# Patient Record
Sex: MALE | Employment: FULL TIME | URBAN - METROPOLITAN AREA
[De-identification: names, ages, dates, MRNs, and addresses within clinical notes are randomized per-mention and may not be internally consistent; named-entity substitution may affect disease eponyms.]

---

## 2018-01-17 ENCOUNTER — OFFICE VISIT (OUTPATIENT)
Dept: INTERNAL MEDICINE | Facility: CLINIC | Age: 53
End: 2018-01-17
Payer: COMMERCIAL

## 2018-01-17 VITALS
HEART RATE: 120 BPM | HEIGHT: 70 IN | DIASTOLIC BLOOD PRESSURE: 78 MMHG | TEMPERATURE: 99 F | OXYGEN SATURATION: 99 % | BODY MASS INDEX: 26.88 KG/M2 | RESPIRATION RATE: 18 BRPM | SYSTOLIC BLOOD PRESSURE: 120 MMHG | WEIGHT: 187.8 LBS

## 2018-01-17 DIAGNOSIS — E03.9 HYPOTHYROIDISM, UNSPECIFIED TYPE: Primary | ICD-10-CM

## 2018-01-17 DIAGNOSIS — E78.5 HYPERLIPIDEMIA, UNSPECIFIED HYPERLIPIDEMIA TYPE: ICD-10-CM

## 2018-01-17 DIAGNOSIS — Z00.00 ROUTINE HEALTH MAINTENANCE: ICD-10-CM

## 2018-01-17 DIAGNOSIS — R39.9 LOWER URINARY TRACT SYMPTOMS (LUTS): ICD-10-CM

## 2018-01-17 DIAGNOSIS — R73.02 IGT (IMPAIRED GLUCOSE TOLERANCE): ICD-10-CM

## 2018-01-17 RX ORDER — LEVOTHYROXINE SODIUM 150 UG/1
150 TABLET ORAL DAILY
Qty: 100 TABLET | Refills: 3 | Status: SHIPPED | OUTPATIENT
Start: 2018-01-17 | End: 2019-01-21

## 2018-01-17 ASSESSMENT — ENCOUNTER SYMPTOMS
DIFFICULTY URINATING: 0
SMELL DISTURBANCE: 0
ARTHRALGIAS: 1
MUSCLE WEAKNESS: 0
STIFFNESS: 0
SORE THROAT: 0
FLANK PAIN: 0
HEMATURIA: 0
SINUS PAIN: 0
DYSURIA: 0
TASTE DISTURBANCE: 0
NECK MASS: 0
BACK PAIN: 0
TROUBLE SWALLOWING: 0
MUSCLE CRAMPS: 0
HOARSE VOICE: 0
MYALGIAS: 0
NECK PAIN: 0
SINUS CONGESTION: 0
JOINT SWELLING: 0

## 2018-01-17 ASSESSMENT — PAIN SCALES - GENERAL: PAINLEVEL: NO PAIN (0)

## 2018-01-17 NOTE — MR AVS SNAPSHOT
After Visit Summary   1/17/2018    Alexa Shaikh    MRN: [id number]           Patient Information     Date Of Birth          1965        Visit Information        Provider Department      1/17/2018 5:05 PM Ritesh Paniagua MD [facility]        Today's Diagnoses     Hypothyroidism, unspecified type    -  1    Hyperlipidemia, unspecified hyperlipidemia type        IGT (impaired glucose tolerance)        Routine health maintenance        Lower urinary tract symptoms (LUTS)          Care Instructions    Primary Care Center Medication Refill Request Information:  * Please contact your pharmacy regarding ANY request for medication refills.  ** [facility] Prescription Fax = [phone]  * Please allow 3 business days for routine medication refills.  * Please allow 5 business days for controlled substance medication refills.     Primary Care Center Test Result notification information:  *You will be notified with in 7-10 days of your appointment day regarding the results of your test.  If you are on MyChart you will be notified as soon as the provider has reviewed the results and signed off on them.    Primary Care Center [phone]     Colonoscopy  [phone]            Follow-ups after your visit        Additional Services     GI Procedure Referral       Last Lab Result: Creatinine (mg/dL)       Date                     Value                 04/10/2015               0.88             ----------  Body mass index is 26.95 kg/(m^2).     Needed:  No  Language:  English    Patient will be contacted to schedule procedure.     Please be aware that coverage of these services is subject to the terms and limitations of your health insurance plan.  Call member services at your health plan with any benefit or coverage questions.  Any procedures must be performed at a [place] facility OR coordinated by your clinic's referral office.    Please bring the following with you to your  appointment:    (1) Any X-Rays, CTs or MRIs which have been performed.  Contact the facility where they were done to arrange for  prior to your scheduled appointment.    (2) List of current medications   (3) This referral request   (4) Any documents/labs given to you for this referral                  Follow-up notes from your care team     Return in about 1 year (around 1/17/2019).      Future tests that were ordered for you today     Open Future Orders        Priority Expected Expires Ordered    CBC with platelets differential Routine  1/17/2019 1/17/2018    Comprehensive metabolic panel Routine  1/17/2019 1/17/2018    Hemoglobin A1c Routine  1/17/2019 1/17/2018    Lipid Profile Routine  1/17/2019 1/17/2018    TSH Routine  1/17/2019 1/17/2018            Who to contact     Please call your clinic at [phone] to:    Ask questions about your health    Make or cancel appointments    Discuss your medicines    Learn about your test results    Speak to your doctor   If you have compliments or concerns about an experience at your clinic, or if you wish to file a complaint, please contact [facility] Patient Relations at [phone] or email us at [email or web address]         Additional Information About Your Visit        [employer] Information     [employer] gives you secure access to your electronic health record. If you see a primary care provider, you can also send messages to your care team and make appointments. If you have questions, please call your primary care clinic.  If you do not have a primary care provider, please call [phone] and they will assist you.      [employer] is an electronic gateway that provides easy, online access to your medical records. With [employer], you can request a clinic appointment, read your test results, renew a prescription or communicate with your care team.     To access your existing account, please contact your [facility]  [facility] Clinic or call [phone] for assistance.        Care EveryWhere ID     This is your Care EveryWhere ID. This could be used by other organizations to access your [place] medical records  [id number]        Your Vitals Were     Pulse Temperature Respirations Height Pulse Oximetry BMI (Body Mass Index)    120 99  F (37.2  C) (Oral) 18 1.778 m (5' 10\") 99% 26.95 kg/m2       Blood Pressure from Last 3 Encounters:   01/17/18 120/78   07/21/16 123/84   04/10/15 128/87    Weight from Last 3 Encounters:   01/17/18 85.2 kg (187 lb 12.8 oz)   07/21/16 89.4 kg (197 lb)   04/10/15 87 kg (191 lb 14.4 oz)              We Performed the Following     ADMIN: Vaccine, Initial (97286)     Albumin Random Urine Quantitative with Creat Ratio     GI Procedure Referral     Pneumococcal vaccine 23 valent PPSV23  (Pneumovax) [14784]          Today's Medication Changes          These changes are accurate as of: 1/17/18  5:57 PM.  If you have any questions, ask your nurse or doctor.               Stop taking these medicines if you haven't already. Please contact your care team if you have questions.     aspirin 81 MG EC tablet   Stopped by:  Ritesh Paniagua MD                Where to get your medicines      These medications were sent to [facility] [place], [place] - [address]  [address], [facility] [address]    Hours:  TRANSPLANT PHONE NUMBER [phone] Phone:  [phone]     levothyroxine 150 MCG tablet                Primary Care Provider Office Phone # Fax #    Ritesh Paniagua [phone]       [address] [place] [address] [address] [facility] [address]  [facility] [address]        Equal Access to Services     OFE SHER AH: Aj Buenrostro, jose eduardo jarrett, jb alonso. So [facility] [phone].    ATENCIÓN: Si habla español, tiene a waller disposición servicios gratuitos de asistencia lingüística. " Rosaura masters [phone].    We comply with applicable federal civil rights laws and [place] laws. We do not discriminate on the basis of race, color, national origin, age, disability, sex, sexual orientation, or gender identity.            Thank you!     Thank you for choosing [facility]  for your care. Our goal is always to provide you with excellent care. Hearing back from our patients is one way we can continue to improve our services. Please take a few minutes to complete the written survey that you may receive in the mail after your visit with us. Thank you!             Your Updated Medication List - Protect others around you: Learn how to safely use, store and throw away your medicines at www.disposemymeds.org.          This list is accurate as of: 1/17/18  5:57 PM.  Always use your most recent med list.                   Brand Name Dispense Instructions for use Diagnosis    DAILY MULTIVITAMIN PO      Take 1 tablet by mouth daily.        levothyroxine 150 MCG tablet    SYNTHROID/LEVOTHROID    100 tablet    Take 1 tablet (150 mcg) by mouth daily    Hypothyroidism, unspecified type

## 2018-01-17 NOTE — PROGRESS NOTES
HPI  52-year-old presents today for physical examination and reevaluation of his blood sugars.  He has dramatically improved his diet reduced sugar and refined carbohydrates he is exercising now on a regular basis his weight is down 10 pounds his blood sugars are much improved he is checking both fasting and 2 hours postprandial the highest 2 hours postprandial sugar is 145 fastings are running in the 100-120 range for the most part.  He is motivated to continue to exercise and watch his diet and keep the weight off.  He has been having some lower urinary tract symptoms his AUA score is 10 he is having nocturia ×1 his dad had significant BPH and was treated with Proscar we discussed the benefits and side effects of Proscar and he is going to defer on this at the present time.  Of note is that his prostate was not that large on examination today.  He has not had a recent lipid evaluation so he will return fasting for his lab studies.  He is taking the thyroid tolerating this well and having no ill effects from this.  Otherwise he has no complaints or problems.    Past and Family hx reviewed and updated    Past Medical History:   Diagnosis Date     Graves' disease     s/p I-131     hemorrhoids      Hyperlipidemia     early metabolic syndrome     Hypothyroidism      IGT (impaired glucose tolerance)      Lower urinary tract symptoms (LUTS) 2018     Past Surgical History:   Procedure Laterality Date     elective circumcision       Family History   Problem Relation Age of Onset     Thyroid Disease Sister      Graves d     CANCER Mother      throat cancer      Thyroid Disease Mother      Graves d     DIABETES Mother      Hypertension Father      Social History     Social History     Marital status:      Spouse name: N/A     Number of children: N/A     Years of education: N/A     Occupational History     research opthalmology [employer]     Social History Main Topics     Smoking status: Never Smoker      "Smokeless tobacco: Never Used     Alcohol use No     Drug use: No     Sexual activity: Not Asked     Other Topics Concern     Special Diet Yes     vegetarian     Exercise No     Social History Narrative     Answers for HPI/ROS submitted by the patient on 1/17/2018   General Symptoms: No  Skin Symptoms: No  HENT Symptoms: Yes  EYE SYMPTOMS: No  HEART SYMPTOMS: No  LUNG SYMPTOMS: No  INTESTINAL SYMPTOMS: No  URINARY SYMPTOMS: Yes  REPRODUCTIVE SYMPTOMS: No  SKELETAL SYMPTOMS: Yes  BLOOD SYMPTOMS: No  NERVOUS SYSTEM SYMPTOMS: No  MENTAL HEALTH SYMPTOMS: No  Ear pain: No  Ear discharge: No  Hearing loss: No  Tinnitus: No  Nosebleeds: Yes  Congestion: No  Sinus pain: No  Trouble swallowing: No   Voice hoarseness: No  Mouth sores: No  Sore throat: No  Tooth pain: No  Gum tenderness: No  Bleeding gums: No  Change in taste: No  Change in sense of smell: No  Dry mouth: No  Hearing aid used: No  Neck lump: No  Trouble holding urine or incontinence: No  Pain or burning: No  Trouble starting or stopping: No  Increased frequency of urination: Yes  Blood in urine: No  Decreased frequency of urination: No  Frequent nighttime urination: No  Flank pain: No  Difficulty emptying bladder: No  Back pain: No  Muscle aches: No  Neck pain: No  Swollen joints: No  Joint pain: Yes  Bone pain: No  Muscle cramps: No  Muscle weakness: No  Joint stiffness: No  Bone fracture: No    Exam:  /78 (BP Location: Right arm, Patient Position: Chair, Cuff Size: Adult Regular)  Pulse 120  Temp 99  F (37.2  C) (Oral)  Resp 18  Ht 1.778 m (5' 10\")  Wt 85.2 kg (187 lb 12.8 oz)  SpO2 99%  BMI 26.95 kg/m2  187 lbs 12.8 oz  Physical Exam   The patient is alert, oriented with a clear sensorium.   Skin shows no lesions or rashes and good turgor.   Head is normocephalic and atraumatic.   Eyes show PERRLA with benign optic fundi.   Ears show normal TMs bilaterally.   Mouth shows clear oral mucosa.   Neck shows no nodes, thyromegaly or bruits.   Back is " non tender.  Lungs are clear to percussion and auscultation.   Heart shows normal S1 and S2 without murmur or gallop.   Abdomen is soft and nontender without masses or organomegaly.   Genitalia show normal testes. No evidence of inguinal hernia.  Rectal shows moderate smooth prostate without nodules or masses.  Extremities show no edema and no evidence of active synovitis.   Neurologic examination shows cranial nerves II-XII intact. Motor shows 5/5 strength. Reflexes are symmetric. Cerebellar testing shows normal tandem gait.  Romberg negative.    ASSESSMENT  1 impaired glucose tolerance possible early diabetes appears well controlled  2 hyperlipidemia needs reevaluation  3 hypothyroidism on replacement  For lower urinary tract symptoms mild does not desire treatment    Plan  He needs a colonoscopy he needs his labs evaluated today we will update his immunizations with a pneumococcal 23 I renewed his thyroid at the current dose pending results of his TSH.  He will follow-up for fasting labs    This note was completed using Dragon voice recognition software.  Although reviewed after completion, some word and grammatical errors may occur.    Ritesh Paniagua MD  General Internal Medicine  Primary Care Center  [phone]

## 2018-01-17 NOTE — NURSING NOTE
Chief Complaint   Patient presents with     Physical     Patient is here for annual physical exam     Refill Request     Also here for Synthroid refills     John Dai CMA (AAMA) at 4:51 PM on 1/17/2018

## 2018-01-17 NOTE — PATIENT INSTRUCTIONS
[facility] Medication Refill Request Information:  * Please contact your pharmacy regarding ANY request for medication refills.  ** [facility] Prescription Fax = [phone]  * Please allow 3 business days for routine medication refills.  * Please allow 5 business days for controlled substance medication refills.     [facility] Test Result notification information:  *You will be notified with in 7-10 days of your appointment day regarding the results of your test.  If you are on MyChart you will be notified as soon as the provider has reviewed the results and signed off on them.    [facility] [phone]     [facility]  [phone]

## 2018-01-18 ENCOUNTER — TELEPHONE (OUTPATIENT)
Dept: GASTROENTEROLOGY | Facility: CLINIC | Age: 53
End: 2018-01-18

## 2018-01-18 NOTE — NURSING NOTE
Administered Pneumococcal 23 vaccine (see Immunizations in Chart Review). Patient tolerated well.  John Dai CMA at 6:03 PM on 1/17/2018

## 2018-01-19 ENCOUNTER — TELEPHONE (OUTPATIENT)
Dept: GASTROENTEROLOGY | Facility: CLINIC | Age: 53
End: 2018-01-19

## 2018-01-19 DIAGNOSIS — E03.9 HYPOTHYROIDISM, UNSPECIFIED TYPE: ICD-10-CM

## 2018-01-19 DIAGNOSIS — R73.02 IGT (IMPAIRED GLUCOSE TOLERANCE): ICD-10-CM

## 2018-01-19 DIAGNOSIS — E78.5 HYPERLIPIDEMIA, UNSPECIFIED HYPERLIPIDEMIA TYPE: ICD-10-CM

## 2018-01-19 LAB
ALBUMIN SERPL-MCNC: 4 G/DL (ref 3.4–5)
ALP SERPL-CCNC: 64 U/L (ref 40–150)
ALT SERPL W P-5'-P-CCNC: 31 U/L (ref 0–70)
ANION GAP SERPL CALCULATED.3IONS-SCNC: 5 MMOL/L (ref 3–14)
AST SERPL W P-5'-P-CCNC: 13 U/L (ref 0–45)
BASOPHILS # BLD AUTO: 0 10E9/L (ref 0–0.2)
BASOPHILS NFR BLD AUTO: 0.8 %
BILIRUB SERPL-MCNC: 1.2 MG/DL (ref 0.2–1.3)
BUN SERPL-MCNC: 13 MG/DL (ref 7–30)
CALCIUM SERPL-MCNC: 8.5 MG/DL (ref 8.5–10.1)
CHLORIDE SERPL-SCNC: 104 MMOL/L (ref 94–109)
CHOLEST SERPL-MCNC: 150 MG/DL
CO2 SERPL-SCNC: 29 MMOL/L (ref 20–32)
CREAT SERPL-MCNC: 0.93 MG/DL (ref 0.66–1.25)
CREAT UR-MCNC: 62 MG/DL
DIFFERENTIAL METHOD BLD: NORMAL
EOSINOPHIL # BLD AUTO: 0.1 10E9/L (ref 0–0.7)
EOSINOPHIL NFR BLD AUTO: 2.2 %
ERYTHROCYTE [DISTWIDTH] IN BLOOD BY AUTOMATED COUNT: 12.5 % (ref 10–15)
GFR SERPL CREATININE-BSD FRML MDRD: 85 ML/MIN/1.7M2
GLUCOSE SERPL-MCNC: 106 MG/DL (ref 70–99)
HBA1C MFR BLD: 5.9 % (ref 4.3–6)
HCT VFR BLD AUTO: 47.7 % (ref 40–53)
HDLC SERPL-MCNC: 46 MG/DL
HGB BLD-MCNC: 15.5 G/DL (ref 13.3–17.7)
IMM GRANULOCYTES # BLD: 0 10E9/L (ref 0–0.4)
IMM GRANULOCYTES NFR BLD: 0.2 %
LDLC SERPL CALC-MCNC: 84 MG/DL
LYMPHOCYTES # BLD AUTO: 1.3 10E9/L (ref 0.8–5.3)
LYMPHOCYTES NFR BLD AUTO: 27.3 %
MCH RBC QN AUTO: 30.7 PG (ref 26.5–33)
MCHC RBC AUTO-ENTMCNC: 32.5 G/DL (ref 31.5–36.5)
MCV RBC AUTO: 95 FL (ref 78–100)
MICROALBUMIN UR-MCNC: <5 MG/L
MICROALBUMIN/CREAT UR: NORMAL MG/G CR (ref 0–17)
MONOCYTES # BLD AUTO: 0.4 10E9/L (ref 0–1.3)
MONOCYTES NFR BLD AUTO: 7.6 %
NEUTROPHILS # BLD AUTO: 3 10E9/L (ref 1.6–8.3)
NEUTROPHILS NFR BLD AUTO: 61.9 %
NONHDLC SERPL-MCNC: 103 MG/DL
NRBC # BLD AUTO: 0 10*3/UL
NRBC BLD AUTO-RTO: 0 /100
PLATELET # BLD AUTO: 157 10E9/L (ref 150–450)
POTASSIUM SERPL-SCNC: 4.2 MMOL/L (ref 3.4–5.3)
PROT SERPL-MCNC: 7.3 G/DL (ref 6.8–8.8)
RBC # BLD AUTO: 5.05 10E12/L (ref 4.4–5.9)
SODIUM SERPL-SCNC: 138 MMOL/L (ref 133–144)
TRIGL SERPL-MCNC: 96 MG/DL
TSH SERPL DL<=0.005 MIU/L-ACNC: 2.37 MU/L (ref 0.4–4)
WBC # BLD AUTO: 4.9 10E9/L (ref 4–11)

## 2018-03-16 ENCOUNTER — TELEPHONE (OUTPATIENT)
Dept: GASTROENTEROLOGY | Facility: OUTPATIENT CENTER | Age: 53
End: 2018-03-16

## 2018-03-16 DIAGNOSIS — Z12.11 ENCOUNTER FOR SCREENING COLONOSCOPY: Primary | ICD-10-CM

## 2018-03-16 RX ORDER — PEG-3350, SODIUM SULFATE, SODIUM CHLORIDE, POTASSIUM CHLORIDE, SODIUM ASCORBATE AND ASCORBIC ACID 7.5-2.691G
1 KIT ORAL SEE ADMIN INSTRUCTIONS
Qty: 1 EACH | Refills: 0 | Status: SHIPPED | OUTPATIENT
Start: 2018-03-16 | End: 2018-03-20

## 2018-03-16 RX ORDER — SIMETHICONE 125 MG
125 CAPSULE ORAL SEE ADMIN INSTRUCTIONS
Qty: 1 CAPSULE | Refills: 0 | Status: SHIPPED | OUTPATIENT
Start: 2018-03-16 | End: 2018-03-20

## 2018-03-16 NOTE — TELEPHONE ENCOUNTER
Patient taking any blood thinners ? no    Heart disease ? denies    Lung disease ?denies      Sleep apnea ?denies    Diabetic ?denies    Kidney disease ?denies    Dialysis ? n/a    Electronic implanted medical devices ?denies    Are you taking any narcotic pain medication ?  no What is your daily dosage ?    PTSD ? n/a    Prep instructions reviewed with patient ? Patient declined review.  policy, conscious sedation plan reviewed. Advised patient to have someone stay with him post exam    Pharmacy : [facility]    Indication for procedure : Screening colonoscopy    Referring provider : Dr. Ritesh hu     Arrival Time : Patient will arrive at 8:30 AM

## 2018-03-23 ENCOUNTER — DOCUMENTATION ONLY (OUTPATIENT)
Dept: GASTROENTEROLOGY | Facility: OUTPATIENT CENTER | Age: 53
End: 2018-03-23
Payer: COMMERCIAL

## 2018-03-23 ENCOUNTER — TRANSFERRED RECORDS (OUTPATIENT)
Dept: HEALTH INFORMATION MANAGEMENT | Facility: CLINIC | Age: 53
End: 2018-03-23

## 2018-03-23 DIAGNOSIS — Z12.11 ENCOUNTER FOR SCREENING COLONOSCOPY: Primary | ICD-10-CM

## 2018-03-27 LAB — COPATH REPORT: NORMAL

## 2018-08-29 ENCOUNTER — OFFICE VISIT (OUTPATIENT)
Dept: INTERNAL MEDICINE | Facility: CLINIC | Age: 53
End: 2018-08-29
Payer: COMMERCIAL

## 2018-08-29 VITALS
DIASTOLIC BLOOD PRESSURE: 81 MMHG | RESPIRATION RATE: 16 BRPM | WEIGHT: 188 LBS | HEART RATE: 75 BPM | SYSTOLIC BLOOD PRESSURE: 133 MMHG | BODY MASS INDEX: 26.98 KG/M2

## 2018-08-29 DIAGNOSIS — R10.13 ABDOMINAL PAIN, EPIGASTRIC: Primary | ICD-10-CM

## 2018-08-29 RX ORDER — PANTOPRAZOLE SODIUM 40 MG/1
40 TABLET, DELAYED RELEASE ORAL DAILY
Qty: 30 TABLET | Refills: 3 | Status: SHIPPED | OUTPATIENT
Start: 2018-08-29 | End: 2019-01-21

## 2018-08-29 RX ORDER — CALCIUM CARBONATE 500 MG/1
1 TABLET, CHEWABLE ORAL 2 TIMES DAILY
COMMUNITY
End: 2019-01-21

## 2018-08-29 RX ORDER — MAGNESIUM CITRATE 1.75 G/29.6ML
LIQUID ORAL
Refills: 0 | COMMUNITY
Start: 2018-03-16 | End: 2018-09-26

## 2018-08-29 ASSESSMENT — PAIN SCALES - GENERAL: PAINLEVEL: NO PAIN (1)

## 2018-08-29 NOTE — PATIENT INSTRUCTIONS
[facility] Medication Refill Request Information:  * Please contact your pharmacy regarding ANY request for medication refills.  ** [facility] Prescription Fax = [phone]  * Please allow 3 business days for routine medication refills.  * Please allow 5 business days for controlled substance medication refills.     [facility] Test Result notification information:  *You will be notified with in 7-10 days of your appointment day regarding the results of your test.  If you are on MyChart you will be notified as soon as the provider has reviewed the results and signed off on them.    [facility]: [phone]

## 2018-08-29 NOTE — NURSING NOTE
Chief Complaint   Patient presents with     Abdominal Pain     pt states having stomache pain       Elyse Gifford CMA at 12:25 PM on 8/29/2018.

## 2018-08-29 NOTE — MR AVS SNAPSHOT
After Visit Summary   8/29/2018    Alexa Shaikh    MRN: [id number]           Patient Information     Date Of Birth          1965        Visit Information        Provider Department      8/29/2018 12:30 PM Penny Ma MD [facility]        Today's Diagnoses     Abdominal pain, epigastric    -  1      Care Instructions    Primary Care Center Medication Refill Request Information:  * Please contact your pharmacy regarding ANY request for medication refills.  ** [facility] Prescription Fax = [phone]  * Please allow 3 business days for routine medication refills.  * Please allow 5 business days for controlled substance medication refills.     [facility] Center Test Result notification information:  *You will be notified with in 7-10 days of your appointment day regarding the results of your test.  If you are on MyChart you will be notified as soon as the provider has reviewed the results and signed off on them.    [facility]: [phone]             Follow-ups after your visit        Follow-up notes from your care team     Return in about 4 weeks (around 9/26/2018) for Dr. Paniagua next available, Routine Visit.      Future tests that were ordered for you today     Open Future Orders        Priority Expected Expires Ordered    H Pylori antigen stool Routine  9/28/2018 8/29/2018            Who to contact     Please call your clinic at [phone] to:    Ask questions about your health    Make or cancel appointments    Discuss your medicines    Learn about your test results    Speak to your doctor            Additional Information About Your Visit        MyChart Information     [employer] gives you secure access to your electronic health record. If you see a primary care provider, you can also send messages to your care team and make appointments. If you have questions, please call your primary care clinic.  If you do not have a primary care provider, please call  [phone] and they will assist you.      [employer] is an electronic gateway that provides easy, online access to your medical records. With [employer], you can request a clinic appointment, read your test results, renew a prescription or communicate with your care team.     To access your existing account, please contact your [facility] or call [phone] for assistance.        Care EveryWhere ID     This is your Care EveryWhere ID. This could be used by other organizations to access your [place] medical records  [id number]        Your Vitals Were     Pulse Respirations BMI (Body Mass Index)             75 16 26.98 kg/m2          Blood Pressure from Last 3 Encounters:   08/29/18 133/81   01/17/18 120/78   07/21/16 123/84    Weight from Last 3 Encounters:   08/29/18 85.3 kg (188 lb)   01/17/18 85.2 kg (187 lb 12.8 oz)   07/21/16 89.4 kg (197 lb)                 Today's Medication Changes          These changes are accurate as of 8/29/18  1:25 PM.  If you have any questions, ask your nurse or doctor.               Start taking these medicines.        Dose/Directions    pantoprazole 40 MG EC tablet   Commonly known as:  PROTONIX   Used for:  Abdominal pain, epigastric   Started by:  Penny Ma MD        Dose:  40 mg   Take 1 tablet (40 mg) by mouth daily   Quantity:  30 tablet   Refills:  3            Where to get your medicines      [facility] [address] [place] [address] [address] [place] [address]  [address], [facility] [address]     Phone:  [phone]     pantoprazole 40 MG EC tablet                Primary Care Provider Office Phone # Fax #    Ritesh Paniagua [phone]       [address] [place] [address]        Equal Access to Services     OFE SHER AH: Aj Buenrostro, jose eduardo jarrett, qajb mahan  tiffanie oseilevarjoyce cota'aaclaus ah. So [facility] [phone].    ATENCIÓN: Si habla shantel, tiene a waller disposición servicios gratuitos de asistencia lingüística. Rosaura al [phone].    We comply with applicable federal civil rights laws and [place] laws. We do not discriminate on the basis of race, color, national origin, age, disability, sex, sexual orientation, or gender identity.            Thank you!     Thank you for choosing [facility]  for your care. Our goal is always to provide you with excellent care. Hearing back from our patients is one way we can continue to improve our services. Please take a few minutes to complete the written survey that you may receive in the mail after your visit with us. Thank you!             Your Updated Medication List - Protect others around you: Learn how to safely use, store and throw away your medicines at www.disposemymeds.org.          This list is accurate as of 8/29/18  1:25 PM.  Always use your most recent med list.                   Brand Name Dispense Instructions for use Diagnosis    calcium carbonate 500 MG chewable tablet    TUMS     Take 1 chew tab by mouth 2 times daily        DAILY MULTIVITAMIN PO      Take 1 tablet by mouth daily.        GNP MAGNESIUM CITRATE 1.745 GM/30ML solution   Generic drug:  magnesium citrate           levothyroxine 150 MCG tablet    SYNTHROID/LEVOTHROID    100 tablet    Take 1 tablet (150 mcg) by mouth daily    Hypothyroidism, unspecified type       pantoprazole 40 MG EC tablet    PROTONIX    30 tablet    Take 1 tablet (40 mg) by mouth daily    Abdominal pain, epigastric       ZANTAC PO      Take by mouth as needed for heartburn

## 2018-09-03 NOTE — PROGRESS NOTES
Alexa was seen today for upper to mid abdominal pain.  He is a 53 year old male with a past medical history of Graves' disease; hemorrhoids; Hyperlipidemia; Hypothyroidism; IGT (impaired glucose tolerance); and Lower urinary tract symptoms (LUTS) (1/17/2018).     Over the past four weeks, has had midepigastric to left upper and left middle abdominal pain, described as sharp, comes and goes, isn't always related to eating.  No stool changes such as diarrhea or melena.  No new foods or change in diet.  He tried OTC H2 blockers as well as tums which did seem to help somewhat.      He recently had his first screening colonoscopy which showed one, 4 mm polyp.  No diverticula were seen.    On exam  B/P: 133/81, P: 75, R: 16  Abd +BS, S, NT, ND, no masses or HSM  Skin no rashes or jaundice      A/P    Abdominal pain, epigastric; likely GERD, will check for H pylori and treat as indicated, start PPI.  If no improvement, then EGD would be next step.  -     H Pylori antigen stool; Future  -     pantoprazole (PROTONIX) 40 MG EC tablet; Take 1 tablet (40 mg) by mouth daily    Angel Wilson MD

## 2018-09-07 DIAGNOSIS — R10.13 ABDOMINAL PAIN, EPIGASTRIC: ICD-10-CM

## 2018-09-10 LAB
H PYLORI AG STL QL IA: NORMAL
SPECIMEN SOURCE: NORMAL

## 2018-09-26 ENCOUNTER — OFFICE VISIT (OUTPATIENT)
Dept: INTERNAL MEDICINE | Facility: CLINIC | Age: 53
End: 2018-09-26
Payer: COMMERCIAL

## 2018-09-26 VITALS
SYSTOLIC BLOOD PRESSURE: 134 MMHG | BODY MASS INDEX: 27.46 KG/M2 | DIASTOLIC BLOOD PRESSURE: 81 MMHG | WEIGHT: 191.4 LBS | TEMPERATURE: 98 F | RESPIRATION RATE: 18 BRPM | HEART RATE: 82 BPM

## 2018-09-26 DIAGNOSIS — R10.32 ABDOMINAL PAIN, LEFT LOWER QUADRANT: Primary | ICD-10-CM

## 2018-09-26 ASSESSMENT — PAIN SCALES - GENERAL: PAINLEVEL: NO PAIN (0)

## 2018-09-26 NOTE — PROGRESS NOTES
"[facility] Visit  Date: 9/26/18  Attending Provider: Ritesh Paniagua MD      Chief Complaint:  Follow-up from recent visit for abdominal pain    HPI:  Alexa Shaikh is a 53 year-old male who presents today for follow-up from recent visit for abdominal pain. Patient was seen in [facility] by Dr. Wilson on 8/29 following a 2-month hx of abdominal pain. Over those 2 months, he was experiencing episodes of LLQ pain most days (~4 days a week). This pain sometimes seemed to be associated with consumption of fatty foods and other times seemed to be more associated with stress rather than food. Pain is dull and doesn't radiate anywhere from the LLQ. No associated nausea or vomiting. Feels very gassy during these episodes (\"can feel the gas moving through the intestine in that area\") but no associated changes in bowel movements. Zantac and Tums helped a bit. He was seen in the [facility] for this on 8/29 and was started on a PPI at that time. Since starting this medication nearly a month ago now, patient has only had 2 episodes of the pain. Both episodes seemed to be directly associated with stressful events (patient had to fire 2 subordinates). Given the significant decrease in frequency of episodes, patient would like to continue with the PPI for now rather than pursuing any more extensive workup. Of note, patient did have a colonoscopy earlier this year, with 1 polyp removed but without other notable findings.    Medications and allergies were reviewed by me today.     PMH:  Hx of Graves disease s/p I-131  Hypothyroidism  HLD  IGT (impaired glucose tolerance)  LUTS (lower urinary tract symptoms)    SH:  Never smoker, no EtOH use, no other drug use.    ROS:  Negative except as above.    Physical Exam:  /81 (BP Location: Right arm, Patient Position: Sitting, Cuff Size: Adult Regular)  Pulse 82  Temp 98  F (36.7  C) (Oral)  Resp 18  Wt 86.8 kg (191 lb 6.4 oz)  BMI 27.46 kg/m2  Gen: patient appears in no acute " distress, cooperative and conversational   Eyes: anicteric  Resp: breathing appears nonlabored, lung sounds clear to auscultation bilaterally, no wheezes or crackles  CV: RRR, normal S1/S2, no murmurs, rubs, or gallops   Abd: soft, nondistended, nontender in all quadrants, normal bowel sounds  Skin: no lesions/rashes noticed, no jaundice     Assessment and Plan:  1. Abdominal pain    Patient here with episodes of LLQ abdominal pain, significantly decreased in frequency since starting PPI one month ago. Unclear etiology of patient's pain, but with such a dramatic response to the PPI, it would support an acid component to his discomfort. However, location of pain would be unusual for this. LLQ raises some suspicion for diverticular disease. Nothing noted on colonoscopy earlier this year. Other considerations would include gallbladder or pancreas etiology, given the possible association with fatty foods. Discussed options with patient. Plan is to continue with PPI for another month. If pain does not resolve (or worsens in frequency or severity), patient will call into the clinic and we will order either an ultrasound (if he reports association with food) or a CT (if pain doesn't seem to be closely related with food). At that time, we would consider ordering additional labwork as well.      Korin Lozada, MS4    Scribe Disclosure:   I, Korin Lozada, am a medical student serving as a scribe, to document services personally performed by Ritesh Paniagua MD, based on data collection and the provider's statements to me.  The medical student acted as scribe and the encounter documented above was completely performed by myself.  Supervising Doctor Ritesh Paniagua

## 2018-09-26 NOTE — NURSING NOTE
Chief Complaint   Patient presents with     Abdominal Pain     Patient is here for abdominal pain/issues follow up       John Dai CMA (AAMA) at 10:17 AM on 9/26/2018

## 2018-09-26 NOTE — MR AVS SNAPSHOT
After Visit Summary   9/26/2018    Alexa Shaikh    MRN: [id number]           Patient Information     Date Of Birth          1965        Visit Information        Provider Department      9/26/2018 10:20 AM Ritesh Paniagua MD [facility]        Today's Diagnoses     Abdominal pain, left lower quadrant    -  1       Follow-ups after your visit        Who to contact     Please call your clinic at [phone] to:    Ask questions about your health    Make or cancel appointments    Discuss your medicines    Learn about your test results    Speak to your doctor            Additional Information About Your Visit        MyChart Information     [employer] gives you secure access to your electronic health record. If you see a primary care provider, you can also send messages to your care team and make appointments. If you have questions, please call your primary care clinic.  If you do not have a primary care provider, please call [phone] and they will assist you.      [employer] is an electronic gateway that provides easy, online access to your medical records. With [employer], you can request a clinic appointment, read your test results, renew a prescription or communicate with your care team.     To access your existing account, please contact your [facility] or call [phone] for assistance.        Care EveryWhere ID     This is your Care EveryWhere ID. This could be used by other organizations to access your [place] medical records  [id number]        Your Vitals Were     Pulse Temperature Respirations BMI (Body Mass Index)          82 98  F (36.7  C) (Oral) 18 27.46 kg/m2         Blood Pressure from Last 3 Encounters:   09/26/18 134/81   08/29/18 133/81   01/17/18 120/78    Weight from Last 3 Encounters:   09/26/18 86.8 kg (191 lb 6.4 oz)   08/29/18 85.3 kg (188 lb)   01/17/18 85.2 kg (187 lb 12.8 oz)              Today, you had the following      No orders found for display         Today's Medication Changes          These changes are accurate as of 9/26/18 11:59 PM.  If you have any questions, ask your nurse or doctor.               Stop taking these medicines if you haven't already. Please contact your care team if you have questions.     GNP MAGNESIUM CITRATE 1.745 GM/30ML solution   Generic drug:  magnesium citrate   Stopped by:  Ritesh Paniagua MD           ZANTAC PO   Stopped by:  Ritesh Paniagua MD                    Primary Care Provider Office Phone # Fax #    Ritesh Paniagua [phone]       [address] [place] [address] [address]  [employer] [address]        Equal Access to Services     [facility]: Hadii jonel jimenez hadshilao Somelody, waaxda luqadaha, qaybta kaalmada adetonyayada, jb marshall . So [facility] [phone].    ATENCIÓN: Si habla español, tiene a waller disposición servicios gratuitos de asistencia lingüística. [facility] [phone].    We comply with applicable federal civil rights laws and [place] laws. We do not discriminate on the basis of race, color, national origin, age, disability, sex, sexual orientation, or gender identity.            Thank you!     Thank you for choosing [facility]  for your care. Our goal is always to provide you with excellent care. Hearing back from our patients is one way we can continue to improve our services. Please take a few minutes to complete the written survey that you may receive in the mail after your visit with us. Thank you!             Your Updated Medication List - Protect others around you: Learn how to safely use, store and throw away your medicines at www.disposemymeds.org.          This list is accurate as of 9/26/18 11:59 PM.  Always use your most recent med list.                   Brand Name Dispense Instructions for use Diagnosis    calcium carbonate 500 MG chewable tablet    TUMS     Take 1 chew tab by mouth 2 times  daily        DAILY MULTIVITAMIN PO      Take 1 tablet by mouth daily.        levothyroxine 150 MCG tablet    SYNTHROID/LEVOTHROID    100 tablet    Take 1 tablet (150 mcg) by mouth daily    Hypothyroidism, unspecified type       pantoprazole 40 MG EC tablet    PROTONIX    30 tablet    Take 1 tablet (40 mg) by mouth daily    Abdominal pain, epigastric

## 2018-10-05 ENCOUNTER — MYC MEDICAL ADVICE (OUTPATIENT)
Dept: INTERNAL MEDICINE | Facility: CLINIC | Age: 53
End: 2018-10-05

## 2018-10-05 DIAGNOSIS — R10.32 ABDOMINAL PAIN, LEFT LOWER QUADRANT: Primary | ICD-10-CM

## 2018-10-13 ENCOUNTER — RADIANT APPOINTMENT (OUTPATIENT)
Dept: ULTRASOUND IMAGING | Facility: CLINIC | Age: 53
End: 2018-10-13
Attending: INTERNAL MEDICINE
Payer: COMMERCIAL

## 2018-10-13 DIAGNOSIS — R10.32 ABDOMINAL PAIN, LEFT LOWER QUADRANT: ICD-10-CM

## 2018-10-22 DIAGNOSIS — R10.32 LLQ ABDOMINAL PAIN: ICD-10-CM

## 2018-10-22 DIAGNOSIS — R73.02 IGT (IMPAIRED GLUCOSE TOLERANCE): Primary | ICD-10-CM

## 2018-10-22 DIAGNOSIS — R93.5 ABNORMAL US (ULTRASOUND) OF ABDOMEN: ICD-10-CM

## 2019-01-02 ENCOUNTER — ANCILLARY PROCEDURE (OUTPATIENT)
Dept: CT IMAGING | Facility: CLINIC | Age: 54
End: 2019-01-02
Attending: INTERNAL MEDICINE
Payer: COMMERCIAL

## 2019-01-02 DIAGNOSIS — R10.32 LLQ ABDOMINAL PAIN: ICD-10-CM

## 2019-01-02 DIAGNOSIS — R93.5 ABNORMAL US (ULTRASOUND) OF ABDOMEN: ICD-10-CM

## 2019-01-02 RX ORDER — IOPAMIDOL 755 MG/ML
118 INJECTION, SOLUTION INTRAVASCULAR ONCE
Status: COMPLETED | OUTPATIENT
Start: 2019-01-02 | End: 2019-01-02

## 2019-01-02 RX ADMIN — IOPAMIDOL 118 ML: 755 INJECTION, SOLUTION INTRAVASCULAR at 13:00

## 2019-01-02 NOTE — DISCHARGE INSTRUCTIONS

## 2019-01-21 ENCOUNTER — OFFICE VISIT (OUTPATIENT)
Dept: INTERNAL MEDICINE | Facility: CLINIC | Age: 54
End: 2019-01-21
Payer: COMMERCIAL

## 2019-01-21 VITALS
BODY MASS INDEX: 26.86 KG/M2 | OXYGEN SATURATION: 99 % | SYSTOLIC BLOOD PRESSURE: 121 MMHG | HEIGHT: 70 IN | DIASTOLIC BLOOD PRESSURE: 84 MMHG | WEIGHT: 187.6 LBS | HEART RATE: 74 BPM

## 2019-01-21 DIAGNOSIS — R73.02 IGT (IMPAIRED GLUCOSE TOLERANCE): ICD-10-CM

## 2019-01-21 DIAGNOSIS — R93.5 ABNORMAL US (ULTRASOUND) OF ABDOMEN: ICD-10-CM

## 2019-01-21 DIAGNOSIS — E03.9 HYPOTHYROIDISM, UNSPECIFIED TYPE: Primary | ICD-10-CM

## 2019-01-21 DIAGNOSIS — E78.5 HYPERLIPIDEMIA, UNSPECIFIED HYPERLIPIDEMIA TYPE: ICD-10-CM

## 2019-01-21 DIAGNOSIS — D36.9 TUBULAR ADENOMA: ICD-10-CM

## 2019-01-21 DIAGNOSIS — E03.9 HYPOTHYROIDISM, UNSPECIFIED TYPE: ICD-10-CM

## 2019-01-21 LAB
ALBUMIN SERPL-MCNC: 4.1 G/DL (ref 3.4–5)
ALP SERPL-CCNC: 70 U/L (ref 40–150)
ALT SERPL W P-5'-P-CCNC: 33 U/L (ref 0–70)
ANION GAP SERPL CALCULATED.3IONS-SCNC: 5 MMOL/L (ref 3–14)
AST SERPL W P-5'-P-CCNC: 18 U/L (ref 0–45)
BASOPHILS # BLD AUTO: 0.1 10E9/L (ref 0–0.2)
BASOPHILS NFR BLD AUTO: 1 %
BILIRUB SERPL-MCNC: 1.1 MG/DL (ref 0.2–1.3)
BUN SERPL-MCNC: 15 MG/DL (ref 7–30)
CALCIUM SERPL-MCNC: 8.8 MG/DL (ref 8.5–10.1)
CHLORIDE SERPL-SCNC: 104 MMOL/L (ref 94–109)
CHOLEST SERPL-MCNC: 152 MG/DL
CO2 SERPL-SCNC: 28 MMOL/L (ref 20–32)
CREAT SERPL-MCNC: 0.86 MG/DL (ref 0.66–1.25)
DIFFERENTIAL METHOD BLD: NORMAL
EOSINOPHIL # BLD AUTO: 0.1 10E9/L (ref 0–0.7)
EOSINOPHIL NFR BLD AUTO: 1.8 %
ERYTHROCYTE [DISTWIDTH] IN BLOOD BY AUTOMATED COUNT: 12.1 % (ref 10–15)
GFR SERPL CREATININE-BSD FRML MDRD: >90 ML/MIN/{1.73_M2}
GLUCOSE SERPL-MCNC: 109 MG/DL (ref 70–99)
HBA1C MFR BLD: 6.4 % (ref 0–5.6)
HCT VFR BLD AUTO: 49 % (ref 40–53)
HDLC SERPL-MCNC: 45 MG/DL
HGB BLD-MCNC: 16.3 G/DL (ref 13.3–17.7)
IMM GRANULOCYTES # BLD: 0 10E9/L (ref 0–0.4)
IMM GRANULOCYTES NFR BLD: 0.4 %
LDLC SERPL CALC-MCNC: 88 MG/DL
LYMPHOCYTES # BLD AUTO: 1.1 10E9/L (ref 0.8–5.3)
LYMPHOCYTES NFR BLD AUTO: 22.4 %
MCH RBC QN AUTO: 31.1 PG (ref 26.5–33)
MCHC RBC AUTO-ENTMCNC: 33.3 G/DL (ref 31.5–36.5)
MCV RBC AUTO: 94 FL (ref 78–100)
MONOCYTES # BLD AUTO: 0.3 10E9/L (ref 0–1.3)
MONOCYTES NFR BLD AUTO: 6.9 %
NEUTROPHILS # BLD AUTO: 3.4 10E9/L (ref 1.6–8.3)
NEUTROPHILS NFR BLD AUTO: 67.5 %
NONHDLC SERPL-MCNC: 107 MG/DL
NRBC # BLD AUTO: 0 10*3/UL
NRBC BLD AUTO-RTO: 0 /100
PLATELET # BLD AUTO: 175 10E9/L (ref 150–450)
POTASSIUM SERPL-SCNC: 3.9 MMOL/L (ref 3.4–5.3)
PROT SERPL-MCNC: 7.6 G/DL (ref 6.8–8.8)
RBC # BLD AUTO: 5.24 10E12/L (ref 4.4–5.9)
SODIUM SERPL-SCNC: 138 MMOL/L (ref 133–144)
TRIGL SERPL-MCNC: 93 MG/DL
TSH SERPL DL<=0.005 MIU/L-ACNC: 0.19 MU/L (ref 0.4–4)
WBC # BLD AUTO: 5 10E9/L (ref 4–11)

## 2019-01-21 RX ORDER — LEVOTHYROXINE SODIUM 150 UG/1
150 TABLET ORAL DAILY
Qty: 100 TABLET | Refills: 3 | Status: SHIPPED | OUTPATIENT
Start: 2019-01-21 | End: 2019-01-21

## 2019-01-21 RX ORDER — LEVOTHYROXINE SODIUM 137 UG/1
137 TABLET ORAL DAILY
Qty: 100 TABLET | Refills: 3 | Status: SHIPPED | OUTPATIENT
Start: 2019-01-21 | End: 2019-02-07

## 2019-01-21 ASSESSMENT — ENCOUNTER SYMPTOMS
BLOATING: 0
CONSTIPATION: 0
JAUNDICE: 0
NAUSEA: 0
RECTAL PAIN: 0
HEARTBURN: 0
BLOOD IN STOOL: 0
ABDOMINAL PAIN: 0
VOMITING: 0
DIARRHEA: 0
BOWEL INCONTINENCE: 0

## 2019-01-21 ASSESSMENT — MIFFLIN-ST. JEOR: SCORE: 1700.33

## 2019-01-21 NOTE — PROGRESS NOTES
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  He has been doing much better in regards to the abdominal pain.  He will still feel occasional discomfort in the left lower quadrant with this has significantly improved and diminished from before.  Is no longer taking the PPI.  Is on thyroid supplementation is doing well with that.  Is exercising 4-5 days a week on an exercise bike for 20-30 minutes.  Having no symptoms or problems in association with this he is healthy with a low-carb diet.  He is current on his colonoscopy.  He is having a few more prostate symptoms.  He has had frequent weak stream and voiding.  His AUA score today is 14.  Past Medical History:   Diagnosis Date     Graves' disease     s/p I-131     hemorrhoids      Hyperlipidemia     early metabolic syndrome     Hypothyroidism      IGT (impaired glucose tolerance)      Lower urinary tract symptoms (LUTS) 2018     Past Surgical History:   Procedure Laterality Date     elective circumcision       Family History   Problem Relation Age of Onset     Thyroid Disease Sister         Graves d     Cancer Mother         throat cancer      Thyroid Disease Mother         Graves d     Diabetes Mother      Hypertension Father      Social History     Socioeconomic History     Marital status:      Spouse name: None     Number of children: None     Years of education: None     Highest education level: None   Social Needs     Financial resource strain: None     Food insecurity - worry: None     Food insecurity - inability: None     Transportation needs - medical: None     Transportation needs - non-medical: None   Occupational History     Occupation: research opthalmology     Employer: [employer]   Tobacco Use     Smoking status: Never Smoker     Smokeless tobacco: Never Used   Substance and Sexual Activity     Alcohol use: No     Alcohol/week: 0.0 oz     Drug use: No     Sexual activity: None   Other Topics Concern      "Parent/sibling w/ CABG, MI or angioplasty before 65F 55M? Not Asked      Service Not Asked     Blood Transfusions Not Asked     Caffeine Concern Not Asked     Occupational Exposure Not Asked     Hobby Hazards Not Asked     Sleep Concern Not Asked     Stress Concern Not Asked     Weight Concern Not Asked     Special Diet Yes     Comment: vegetarian     Back Care Not Asked     Exercise No     Bike Helmet Not Asked     Seat Belt Not Asked     Self-Exams Not Asked   Social History Narrative     None     Answers for HPI/ROS submitted by the patient on 1/21/2019   General Symptoms: No  Skin Symptoms: No  HENT Symptoms: No  EYE SYMPTOMS: No  HEART SYMPTOMS: No  LUNG SYMPTOMS: No  INTESTINAL SYMPTOMS: Yes  URINARY SYMPTOMS: No  REPRODUCTIVE SYMPTOMS: No  SKELETAL SYMPTOMS: No  BLOOD SYMPTOMS: No  NERVOUS SYSTEM SYMPTOMS: No  MENTAL HEALTH SYMPTOMS: No  Heart burn or indigestion: No  Nausea: No  Vomiting: No  Abdominal pain: No  Bloating: No  Constipation: No  Diarrhea: No  Blood in stool: No  Black stools: No  Rectal or Anal pain: No  Fecal incontinence: No  Yellowing of skin or eyes: No  Vomit with blood: No  Change in stools: No    Exam:  /84   Pulse 74   Ht 1.775 m (5' 9.88\")   Wt 85.1 kg (187 lb 9.6 oz)   SpO2 99%   BMI 27.01 kg/m    187 lbs 9.6 oz  Physical Exam   The patient is alert, oriented with a clear sensorium.   Skin shows no lesions or rashes and good turgor.   Head is normocephalic and atraumatic.   Eyes show PERRLA with poorly seen optic fundi.   Ears show normal TMs bilaterally.   Mouth shows clear oral mucosa.   Neck shows no nodes, thyromegaly or bruits.   Back is non tender.  Lungs are clear to percussion and auscultation.   Heart shows normal S1 and S2 without murmur or gallop.   Abdomen is soft and nontender without masses or organomegaly.   Genitalia show normal testes. No evidence of inguinal hernia.  Rectal shows moderate smooth prostate without nodules or masses.  Extremities " show no edema and no evidence of active synovitis.   Neurologic examination shows cranial nerves II-XII intact. Motor shows 5/5 strength. Reflexes are symmetric. Cerebellar testing shows normal tandem gait.  Romberg negative.      ASSESSMENT  1 hypothyroidism on replacement needs reassessment and dose reduction  2 impaired glucose tolerance needs reassessment and more aggressive treatment with exercise  3 lower urinary tract symptoms  4 history tubular adenoma    Plan  We discussed the risk and benefits of both tamsulosin and finasteride.  At this point he is going to defer medication management but may call for one or both of these in the future and I told him he did not need an appointment to get a new prescription.  We will recheck his labs today encouraged him regarding his diet and exercise and have him follow-up in a year    This note was completed using Dragon voice recognition software.  Although reviewed after completion, some word and grammatical errors may occur.    Ritesh Paniagua MD  General Internal Medicine  Primary Care Center  [phone]

## 2019-01-21 NOTE — PATIENT INSTRUCTIONS
Primary Care Center Phone Number: [phone]   Primary Care Center Medication Refill Request Information:  * Please contact your pharmacy regarding ANY request for medication refills.  ** [facility] Prescription Fax = [phone]  * Please allow 3 business days for routine medication refills.  * Please allow 5 business days for controlled substance medication refills.     Primary [facility] Test Result notification information:  *You will be notified with in 7-10 days of your appointment day regarding the results of your test.  If you are on MyChart you will be notified as soon as the provider has reviewed the results and signed off on them.

## 2019-01-21 NOTE — NURSING NOTE
Chief Complaint   Patient presents with     Physical     pt is here for an annual physical       Sherie Lam EMT at 8:56 AM on 1/21/2019

## 2019-02-05 ENCOUNTER — MYC MEDICAL ADVICE (OUTPATIENT)
Dept: INTERNAL MEDICINE | Facility: CLINIC | Age: 54
End: 2019-02-05

## 2019-02-05 DIAGNOSIS — E03.9 HYPOTHYROIDISM, UNSPECIFIED TYPE: ICD-10-CM

## 2019-02-07 RX ORDER — LEVOTHYROXINE SODIUM 137 UG/1
137 TABLET ORAL DAILY
Qty: 100 TABLET | Refills: 3 | Status: SHIPPED | OUTPATIENT
Start: 2019-02-07 | End: 2020-01-23

## 2019-02-07 NOTE — TELEPHONE ENCOUNTER
levothyroxine (SYNTHROID/LEVOTHROID) 137 MCG tablet  Pharmacy change per tiffanyhart per OC Paniagua/ HI Roach

## 2019-11-04 ENCOUNTER — HEALTH MAINTENANCE LETTER (OUTPATIENT)
Age: 54
End: 2019-11-04

## 2020-01-23 ENCOUNTER — OFFICE VISIT (OUTPATIENT)
Dept: INTERNAL MEDICINE | Facility: CLINIC | Age: 55
End: 2020-01-23
Payer: COMMERCIAL

## 2020-01-23 VITALS
HEIGHT: 69 IN | OXYGEN SATURATION: 98 % | BODY MASS INDEX: 27.64 KG/M2 | TEMPERATURE: 98.4 F | DIASTOLIC BLOOD PRESSURE: 83 MMHG | WEIGHT: 186.6 LBS | SYSTOLIC BLOOD PRESSURE: 126 MMHG | HEART RATE: 81 BPM

## 2020-01-23 DIAGNOSIS — Z23 NEED FOR VACCINATION: Primary | ICD-10-CM

## 2020-01-23 DIAGNOSIS — R39.9 LOWER URINARY TRACT SYMPTOMS (LUTS): ICD-10-CM

## 2020-01-23 DIAGNOSIS — R73.02 IGT (IMPAIRED GLUCOSE TOLERANCE): ICD-10-CM

## 2020-01-23 DIAGNOSIS — E78.5 HYPERLIPIDEMIA, UNSPECIFIED HYPERLIPIDEMIA TYPE: ICD-10-CM

## 2020-01-23 DIAGNOSIS — E03.9 HYPOTHYROIDISM, UNSPECIFIED TYPE: ICD-10-CM

## 2020-01-23 RX ORDER — LEVOTHYROXINE SODIUM 137 UG/1
137 TABLET ORAL DAILY
Qty: 100 TABLET | Refills: 3 | Status: SHIPPED | OUTPATIENT
Start: 2020-01-23 | End: 2020-01-27

## 2020-01-23 RX ORDER — TAMSULOSIN HYDROCHLORIDE 0.4 MG/1
0.4 CAPSULE ORAL DAILY
Qty: 100 CAPSULE | Refills: 3 | Status: SHIPPED | OUTPATIENT
Start: 2020-01-23 | End: 2021-01-26

## 2020-01-23 ASSESSMENT — PAIN SCALES - GENERAL: PAINLEVEL: NO PAIN (0)

## 2020-01-23 ASSESSMENT — ENCOUNTER SYMPTOMS
DIFFICULTY URINATING: 1
FLANK PAIN: 0
DYSURIA: 0
DOUBLE VISION: 0
EYE PAIN: 0
EYE WATERING: 1
HEMATURIA: 0
EYE REDNESS: 1

## 2020-01-23 ASSESSMENT — MIFFLIN-ST. JEOR: SCORE: 1683.28

## 2020-01-23 NOTE — PATIENT INSTRUCTIONS
Primary Care Center Medication Refill Request Information:  * Please contact your pharmacy regarding ANY request for medication refills.  ** [facility] Prescription Fax = [phone]  * Please allow 3 business days for routine medication refills.  * Please allow 5 business days for controlled substance medication refills.     Primary Care Center Test Result notification information:  *You will be notified with in 7-10 days of your appointment day regarding the results of your test.  If you are on MyChart you will be notified as soon as the provider has reviewed the results and signed off on them.    Primary Care Center: [phone]       Your health care Provider has recommended that you receive the new Shingle vaccine called Shingrix to prevent shingles for ages 50 and above. Many private insurance and Medicare Part D plans cover Shingrix. However, not all insurance carriers cover the entire cost of the Shingrix vaccine if the vaccine is administered at your primary care clinic. The clinic cannot determine your insurance benefits.  Please call your insurance carrier prior. The vaccine comes in two doses. Your second dose should be 2-6 months from your first dose.       Prior to receiving the vaccine, we recommend that you call your insurance carrier and ask them the following questions:            1. Is there a cost difference if I receive the vaccine at my doctor's office or a pharmacy?          2. Does my insurance cover the Shingrix Vaccine and administration of the vaccine?          3. What is my co-pay or deductible for the vaccine?        Please call to schedule a Nurse-Visit only at [phone].  Nurse Visit hours are available Monday, Wednesday, and Friday from 9:00 AM-11:00 AM and 1:00 PM-3:00 PM.

## 2020-01-23 NOTE — NURSING NOTE
Chief Complaint   Patient presents with     Physical     pt here for physical     Medication Request     pt would like medication refills       Elyse Gifford CMA at 4:08 PM on 1/23/2020.

## 2020-01-23 NOTE — PROGRESS NOTES
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  He has been doing well he is tolerating his thyroid replacement.  He is continued to have ongoing lower urinary tract symptoms.  This is largely characterized by nocturia and urgency.  His AUA score today is 12.  Is very between 10 and 14 over the past couple years.  He is frustrated enough now however that is interested in starting medication.  We discussed this last year and he deferred.  He is now interested in the tamsulosin and we again reviewed the potential side effects.  Is also concerned about his impaired glucose tolerance he has been more careful with his diet his exercise is restricted to about 20 minutes on an exercise bike 3 times a week we discussed the importance of increasing this.  Try to watch the calories and fat in his diet.  Otherwise he has been feeling well and has no other specific complaints or concerns  Past Medical History:   Diagnosis Date     Graves' disease     s/p I-131     hemorrhoids      Hyperlipidemia     early metabolic syndrome     Hypothyroidism      IGT (impaired glucose tolerance)      Lower urinary tract symptoms (LUTS) 2018     Tubular adenoma 2018     Past Surgical History:   Procedure Laterality Date     elective circumcision       Family History   Problem Relation Age of Onset     Thyroid Disease Sister         Graves d     Cancer Mother         throat cancer      Thyroid Disease Mother         Graves d     Diabetes Mother      Hypertension Father      Social History     Socioeconomic History     Marital status:      Spouse name: None     Number of children: None     Years of education: None     Highest education level: None   Occupational History     Occupation: research opthalmology     Employer: [employer]   Social Needs     Financial resource strain: None     Food insecurity:     Worry: None     Inability: None     Transportation needs:     Medical: None      Non-medical: None   Tobacco Use     Smoking status: Never Smoker     Smokeless tobacco: Never Used   Substance and Sexual Activity     Alcohol use: No     Alcohol/week: 0.0 standard drinks     Drug use: No     Sexual activity: None   Lifestyle     Physical activity:     Days per week: None     Minutes per session: None     Stress: None   Relationships     Social connections:     Talks on phone: None     Gets together: None     Attends Oriental orthodox service: None     Active member of club or organization: None     Attends meetings of clubs or organizations: None     Relationship status: None     Intimate partner violence:     Fear of current or ex partner: None     Emotionally abused: None     Physically abused: None     Forced sexual activity: None   Other Topics Concern     Parent/sibling w/ CABG, MI or angioplasty before 65F 55M? Not Asked      Service Not Asked     Blood Transfusions Not Asked     Caffeine Concern Not Asked     Occupational Exposure Not Asked     Hobby Hazards Not Asked     Sleep Concern Not Asked     Stress Concern Not Asked     Weight Concern Not Asked     Special Diet Yes     Comment: vegetarian     Back Care Not Asked     Exercise No     Bike Helmet Not Asked     Seat Belt Not Asked     Self-Exams Not Asked   Social History Narrative     None     Answers for HPI/ROS submitted by the patient on 1/23/2020   General Symptoms: No  Skin Symptoms: No  HENT Symptoms: No  EYE SYMPTOMS: Yes  HEART SYMPTOMS: No  LUNG SYMPTOMS: No  INTESTINAL SYMPTOMS: No  URINARY SYMPTOMS: Yes  REPRODUCTIVE SYMPTOMS: No  SKELETAL SYMPTOMS: No  BLOOD SYMPTOMS: No  NERVOUS SYSTEM SYMPTOMS: No  MENTAL HEALTH SYMPTOMS: No  Eye pain: No  Vision loss: No  Dry eyes: Yes  Watery eyes: Yes  Eye bulging: No  Double vision: No  Flashing of lights: No  Spots: No  Floaters: No  Redness: Yes  Trouble holding urine or incontinence: Yes  Pain or burning: No  Trouble starting or stopping: Yes  Increased frequency of urination:  "Yes  Blood in urine: No  Decreased frequency of urination: No  Frequent nighttime urination: Yes  Flank pain: No  Difficulty emptying bladder: Yes    Exam:  /83 (BP Location: Right arm, Patient Position: Sitting, Cuff Size: Adult Large)   Pulse 81   Temp 98.4  F (36.9  C) (Oral)   Ht 1.763 m (5' 9.41\")   Wt 84.6 kg (186 lb 9.6 oz)   SpO2 98%   BMI 27.23 kg/m    186 lbs 9.6 oz  Physical Exam   The patient is alert, oriented with a clear sensorium.   Skin shows no lesions or rashes and good turgor.   Head is normocephalic and atraumatic.   Eyes show PERRLA with bilateral cataracts.   Ears show normal TMs bilaterally.   Mouth shows clear oral mucosa.   Neck shows no nodes, thyromegaly or bruits.   Back is non tender.  Lungs are clear to percussion and auscultation.   Heart shows normal S1 and S2 without murmur or gallop.   Abdomen is soft and nontender without masses or organomegaly.   Genitalia show normal testes. No evidence of inguinal hernia.  Rectal shows moderate smooth prostate without nodules or masses.  Extremities show no edema and no evidence of active synovitis.   Neurologic examination shows cranial nerves II-XII intact. Motor shows 5/5 strength. Reflexes are symmetric. Cerebellar testing shows normal tandem gait.  Romberg negative.    ASSESSMENT  1 hypothyroidism on over-replacement reduce to 125 mcg daily  2 hyperlipidemia needs reassessment  3 BPH with lower urinary tract symptoms  4 history of tubular adenoma due for colonoscopy next year    Plan  Have him follow-up with fasting labs.  We will start tamsulosin 0.4 mg at bedtime renewed his levothyroxine pending the results of his TSH we will have him follow-up in a year sooner immediately if any increased symptoms or problems    This note was completed using Dragon voice recognition software.  Although reviewed after completion, some word and grammatical errors may occur.    Ritesh Paniagua MD  General Internal Medicine  Primary Care " [place]  [phone]

## 2020-01-24 DIAGNOSIS — R73.02 IGT (IMPAIRED GLUCOSE TOLERANCE): ICD-10-CM

## 2020-01-24 DIAGNOSIS — E03.9 HYPOTHYROIDISM, UNSPECIFIED TYPE: ICD-10-CM

## 2020-01-24 DIAGNOSIS — E78.5 HYPERLIPIDEMIA, UNSPECIFIED HYPERLIPIDEMIA TYPE: ICD-10-CM

## 2020-01-24 LAB
ANION GAP SERPL CALCULATED.3IONS-SCNC: <1 MMOL/L (ref 3–14)
BUN SERPL-MCNC: 17 MG/DL (ref 7–30)
CALCIUM SERPL-MCNC: 9 MG/DL (ref 8.5–10.1)
CHLORIDE SERPL-SCNC: 108 MMOL/L (ref 94–109)
CHOLEST SERPL-MCNC: 153 MG/DL
CO2 SERPL-SCNC: 31 MMOL/L (ref 20–32)
CREAT SERPL-MCNC: 0.86 MG/DL (ref 0.66–1.25)
GFR SERPL CREATININE-BSD FRML MDRD: >90 ML/MIN/{1.73_M2}
GLUCOSE SERPL-MCNC: 106 MG/DL (ref 70–99)
HBA1C MFR BLD: 6 % (ref 0–5.6)
HDLC SERPL-MCNC: 47 MG/DL
LDLC SERPL CALC-MCNC: 84 MG/DL
NONHDLC SERPL-MCNC: 106 MG/DL
POTASSIUM SERPL-SCNC: 4.2 MMOL/L (ref 3.4–5.3)
SODIUM SERPL-SCNC: 140 MMOL/L (ref 133–144)
TRIGL SERPL-MCNC: 109 MG/DL
TSH SERPL DL<=0.005 MIU/L-ACNC: 0.32 MU/L (ref 0.4–4)

## 2020-01-27 RX ORDER — LEVOTHYROXINE SODIUM 125 UG/1
125 TABLET ORAL DAILY
Qty: 100 TABLET | Refills: 3 | Status: SHIPPED | OUTPATIENT
Start: 2020-01-27 | End: 2021-01-05

## 2020-07-07 ENCOUNTER — MYC MEDICAL ADVICE (OUTPATIENT)
Dept: INTERNAL MEDICINE | Facility: CLINIC | Age: 55
End: 2020-07-07

## 2020-07-08 ENCOUNTER — TRANSFERRED RECORDS (OUTPATIENT)
Dept: HEALTH INFORMATION MANAGEMENT | Facility: CLINIC | Age: 55
End: 2020-07-08

## 2020-07-08 NOTE — TELEPHONE ENCOUNTER
Spoke to forms staff on-site who printed the form and placed in providers in-box for completion.     Mey Sena, Miller  7/8/2020 9:15 AM

## 2020-07-24 ENCOUNTER — VIRTUAL VISIT (OUTPATIENT)
Dept: FAMILY MEDICINE | Facility: OTHER | Age: 55
End: 2020-07-24
Payer: COMMERCIAL

## 2020-07-24 PROCEDURE — 99421 OL DIG E/M SVC 5-10 MIN: CPT | Performed by: PHYSICIAN ASSISTANT

## 2020-07-24 NOTE — PROGRESS NOTES
"Date: 2020 16:58:57  Clinician: You Carroll  Clinician NPI: [id number]  Patient: Alexa Shaikh  Patient : 1965  Patient Address: [address] [place] [address], [place] [address]  Patient Phone: [phone]  Visit Protocol: URI  Patient Summary:  Alexa is a 55 year old ( : 1965 ) male who initiated a Visit for COVID-19 (Coronavirus) evaluation and screening. When asked the question \"Please sign me up to receive news, health information and promotions from [employer].\", Alexa responded \"No\".    Alexa states his symptoms started 1-2 days ago.   His symptoms consist of a cough.   Symptom details   Cough: Alexa coughs every 5-10 minutes and his cough is not more bothersome at night. Phlegm does not come into his throat when he coughs. He does not believe his cough is caused by post-nasal drip.    Alexa denies having wheezing, nausea, teeth pain, ageusia, diarrhea, myalgias, sore throat, anosmia, facial pain or pressure, fever, nasal congestion, vomiting, rhinitis, malaise, ear pain, headache, chills, and enlarged lymph nodes. He also denies having recent facial or sinus surgery in the past 60 days and taking antibiotic medication in the past month. He is not experiencing dyspnea.   Precipitating events  He has not recently been exposed to someone with influenza. Alexa has not been in close contact with any high risk individuals.   Pertinent COVID-19 (Coronavirus) information  In the past 14 days, Alexa has not worked in a congregate living setting.   He does not work or volunteer as healthcare worker or a  and does not work or volunteer in a healthcare facility.   Alexa also has not lived in a congregate living setting in the past 14 days. He does not live with a healthcare worker.   Alexa has not had a close contact with a laboratory-confirmed COVID-19 patient within 14 days of symptom onset.   Pertinent medical history  Alexa needs a return to work/school note.   Weight: 192 lbs   Alexa does " "not smoke or use smokeless tobacco.   Weight: 192 lbs    MEDICATIONS: Synthroid oral, ALLERGIES: omeprazole  Clinician Response:  Dear Alexa,   Your symptoms show that you may have coronavirus (COVID-19). This illness can cause fever, cough and trouble breathing. Many people get a mild case and get better on their own. Some people can get very sick.  What should I do?  We would like to test you for this virus.   1. Please call [phone] to schedule your visit. Explain that you were referred by [facility] to have a COVID-19 test. Be ready to share your [facility] visit ID number.  The following will serve as your written order for this COVID Test, ordered by me, for the indication of suspected COVID [Z20.828]: The test will be ordered in [employer], our electronic health record, after you are scheduled. It will show as ordered and authorized by Michael Ramos MD.  Order: COVID-19 (Coronavirus) PCR for SYMPTOMATIC testing from [facility].      2. When it's time for your COVID test:  Stay at least 6 feet away from others. (If someone will drive you to your test, stay in the backseat, as far away from the  as you can.)   Cover your mouth and nose with a mask, tissue or washcloth.  Go straight to the testing site. Don't make any stops on the way there or back.      3.Starting now: Stay home and away from others (self-isolate) until:   You've had no fever---and no medicine that reduces fever---for 3 full days (72 hours). And...   Your other symptoms have gotten better. For example, your cough or breathing has improved. And...   At least 10 days have passed since your symptoms started.       During this time, don't leave the house except for testing or medical care.   Stay in your own room, even for meals. Use your own bathroom if you can.   Stay away from others in your home. No hugging, kissing or shaking hands. No visitors.  Don't go to work, school or anywhere else.    Clean \"high touch\" surfaces often (doorknobs, counters, " handles, etc.). Use a household cleaning spray or wipes. You'll find a full list of  on the EPA website: www.epa.gov/pesticide-registration/list-n-disinfectants-use-against-sars-cov-2.   Cover your mouth and nose with a mask, tissue or washcloth to avoid spreading germs.  Wash your hands and face often. Use soap and water.  Caregivers in these groups are at risk for severe illness due to COVID-19:  o People 65 years and older  o People who live in a nursing home or long-term care facility  o People with chronic disease (lung, heart, cancer, diabetes, kidney, liver, immunologic)  o People who have a weakened immune system, including those who:   Are in cancer treatment  Take medicine that weakens the immune system, such as corticosteroids  Had a bone marrow or organ transplant  Have an immune deficiency  Have poorly controlled HIV or AIDS  Are obese (body mass index of 40 or higher)  Smoke regularly   o Caregivers should wear gloves while washing dishes, handling laundry and cleaning bedrooms and bathrooms.  o Use caution when washing and drying laundry: Don't shake dirty laundry, and use the warmest water setting that you can.  o For more tips, go to www.cdc.gov/coronavirus/2019-ncov/downloads/10Things.pdf.    4.Sign up for [employer]. We know it's scary to hear that you might have COVID-19. We want to track your symptoms to make sure you're okay over the next 2 weeks. Please look for an email from [employer]---this is a free, online program that we'll use to keep in touch. To sign up, follow the link in the email. Learn more at [email or web address]  How can I take care of myself?   Get lots of rest. Drink extra fluids (unless a doctor has told you not to).   Take Tylenol (acetaminophen) for fever or pain. If you have liver or kidney problems, ask your family doctor if it's okay to take Tylenol.   Adults can take either:    650 mg (two 325 mg pills) every 4 to 6 hours, or...   1,000 mg (two  500 mg pills) every 8 hours as needed.    Note: Don't take more than 3,000 mg in one day. Acetaminophen is found in many medicines (both prescribed and over-the-counter medicines). Read all labels to be sure you don't take too much.   For children, check the Tylenol bottle for the right dose. The dose is based on the child's age or weight.    If you have other health problems (like cancer, heart failure, an organ transplant or severe kidney disease): Call your specialty clinic if you don't feel better in the next 2 days.       Know when to call 911. Emergency warning signs include:    Trouble breathing or shortness of breath Pain or pressure in the chest that doesn't go away Feeling confused like you haven't felt before, or not being able to wake up Bluish-colored lips or face.  Where can I get more information?   [facility] -- About COVID-19: [email or web address]   CDC -- What to Do If You're Sick: www.cdc.gov/coronavirus/2019-ncov/about/steps-when-sick.html   CDC -- Ending Home Isolation: www.cdc.gov/coronavirus/2019-ncov/hcp/disposition-in-home-patients.html   CDC -- Caring for Someone: www.cdc.gov/coronavirus/2019-ncov/if-you-are-sick/care-for-someone.html   [facility] -- Interim Guidance for Hospital Discharge to Home: www.health.[facility].[place]/diseases/coronavirus/hcp/hospdischarge.pdf   [facility] clinical trials (COVID-19 research studies): clinicalaffairs.[facility]/n-clinical-trials    Below are the COVID-19 hotlines at the [place] Department of Health ([facility]). Interpreters are available.    For health questions: Call 343-955-0061 or 1-535.450.2286 (7 a.m. to 7 p.m.) For questions about schools and childcare: Call 531-971-8747 or 1-632.448.7891 (7 a.m. to 7 p.m.)    Diagnosis: Cough  Diagnosis ICD: R05

## 2020-07-26 ENCOUNTER — TELEPHONE (OUTPATIENT)
Dept: FAMILY MEDICINE | Facility: CLINIC | Age: 55
End: 2020-07-26

## 2020-07-26 DIAGNOSIS — Z20.822 SUSPECTED COVID-19 VIRUS INFECTION: Primary | ICD-10-CM

## 2020-07-26 DIAGNOSIS — Z20.822 SUSPECTED COVID-19 VIRUS INFECTION: ICD-10-CM

## 2020-07-26 PROCEDURE — U0003 INFECTIOUS AGENT DETECTION BY NUCLEIC ACID (DNA OR RNA); SEVERE ACUTE RESPIRATORY SYNDROME CORONAVIRUS 2 (SARS-COV-2) (CORONAVIRUS DISEASE [COVID-19]), AMPLIFIED PROBE TECHNIQUE, MAKING USE OF HIGH THROUGHPUT TECHNOLOGIES AS DESCRIBED BY CMS-2020-01-R: HCPCS | Performed by: FAMILY MEDICINE

## 2020-07-27 LAB
SARS-COV-2 RNA SPEC QL NAA+PROBE: NOT DETECTED
SPECIMEN SOURCE: NORMAL

## 2020-07-31 ENCOUNTER — MYC MEDICAL ADVICE (OUTPATIENT)
Dept: INTERNAL MEDICINE | Facility: CLINIC | Age: 55
End: 2020-07-31

## 2020-07-31 DIAGNOSIS — R05.9 COUGH: Primary | ICD-10-CM

## 2020-08-04 ENCOUNTER — VIRTUAL VISIT (OUTPATIENT)
Dept: INTERNAL MEDICINE | Facility: CLINIC | Age: 55
End: 2020-08-04

## 2020-08-04 ENCOUNTER — TELEPHONE (OUTPATIENT)
Dept: INTERNAL MEDICINE | Facility: CLINIC | Age: 55
End: 2020-08-04

## 2020-08-04 ENCOUNTER — ANCILLARY PROCEDURE (OUTPATIENT)
Dept: GENERAL RADIOLOGY | Facility: CLINIC | Age: 55
End: 2020-08-04
Attending: INTERNAL MEDICINE

## 2020-08-04 DIAGNOSIS — R05.9 COUGH: ICD-10-CM

## 2020-08-04 DIAGNOSIS — R05.9 COUGH: Primary | ICD-10-CM

## 2020-08-04 DIAGNOSIS — E03.9 HYPOTHYROIDISM, UNSPECIFIED TYPE: ICD-10-CM

## 2020-08-04 LAB
BASOPHILS # BLD AUTO: 0.1 10E9/L (ref 0–0.2)
BASOPHILS NFR BLD AUTO: 1.4 %
DIFFERENTIAL METHOD BLD: NORMAL
EOSINOPHIL # BLD AUTO: 0.1 10E9/L (ref 0–0.7)
EOSINOPHIL NFR BLD AUTO: 2.6 %
ERYTHROCYTE [DISTWIDTH] IN BLOOD BY AUTOMATED COUNT: 12.1 % (ref 10–15)
HCT VFR BLD AUTO: 47.6 % (ref 40–53)
HGB BLD-MCNC: 15.7 G/DL (ref 13.3–17.7)
IMM GRANULOCYTES # BLD: 0 10E9/L (ref 0–0.4)
IMM GRANULOCYTES NFR BLD: 0.4 %
LYMPHOCYTES # BLD AUTO: 1.2 10E9/L (ref 0.8–5.3)
LYMPHOCYTES NFR BLD AUTO: 24.7 %
MCH RBC QN AUTO: 31.2 PG (ref 26.5–33)
MCHC RBC AUTO-ENTMCNC: 33 G/DL (ref 31.5–36.5)
MCV RBC AUTO: 94 FL (ref 78–100)
MONOCYTES # BLD AUTO: 0.3 10E9/L (ref 0–1.3)
MONOCYTES NFR BLD AUTO: 6.2 %
NEUTROPHILS # BLD AUTO: 3.3 10E9/L (ref 1.6–8.3)
NEUTROPHILS NFR BLD AUTO: 64.7 %
NRBC # BLD AUTO: 0 10*3/UL
NRBC BLD AUTO-RTO: 0 /100
PLATELET # BLD AUTO: 173 10E9/L (ref 150–450)
RBC # BLD AUTO: 5.04 10E12/L (ref 4.4–5.9)
TSH SERPL DL<=0.005 MIU/L-ACNC: 0.53 MU/L (ref 0.4–4)
WBC # BLD AUTO: 5 10E9/L (ref 4–11)

## 2020-08-04 RX ORDER — MONTELUKAST SODIUM 10 MG/1
10 TABLET ORAL AT BEDTIME
Qty: 30 TABLET | Refills: 3 | Status: SHIPPED | OUTPATIENT
Start: 2020-08-04 | End: 2020-12-03

## 2020-08-04 NOTE — PROGRESS NOTES
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  Reports severe cough 10 days ago and stayed away from work.  Since returning to work in a new lab space the cough seems to be worse at work and after. CXR today reviewed and normal. Feels that may have mucous in his throat with sensation of increase in pulse from 65 to 85.    A  Post viral reactive airway disease  P  Trial of montelukast and if no response will get PFTs with methacholine challenge.  (11 minutes)  Ritesh Paniagua MD

## 2020-08-04 NOTE — NURSING NOTE
Chief Complaint   Patient presents with     Cough     Pt is still having a cough after PCR COVID test.      Video Visit Technology for this patient: No video technology available to patient, please call patient over the phone     Mona Vuong LPN at 12:54 PM on 8/4/2020.

## 2020-08-20 ENCOUNTER — MYC MEDICAL ADVICE (OUTPATIENT)
Dept: INTERNAL MEDICINE | Facility: CLINIC | Age: 55
End: 2020-08-20

## 2020-08-24 NOTE — TELEPHONE ENCOUNTER
Spoke to onsite staff  Who will refax form to [employer] and email to patient.    Mey Sena on 8/24/2020 at 9:31 AM

## 2020-11-22 ENCOUNTER — HEALTH MAINTENANCE LETTER (OUTPATIENT)
Age: 55
End: 2020-11-22

## 2020-12-01 DIAGNOSIS — R05.9 COUGH: ICD-10-CM

## 2020-12-03 RX ORDER — MONTELUKAST SODIUM 10 MG/1
10 TABLET ORAL AT BEDTIME
Qty: 30 TABLET | Refills: 3 | Status: SHIPPED | OUTPATIENT
Start: 2020-12-03 | End: 2021-03-10

## 2021-01-04 ENCOUNTER — MYC MEDICAL ADVICE (OUTPATIENT)
Dept: INTERNAL MEDICINE | Facility: CLINIC | Age: 56
End: 2021-01-04

## 2021-01-04 DIAGNOSIS — E03.9 HYPOTHYROIDISM, UNSPECIFIED TYPE: ICD-10-CM

## 2021-01-05 RX ORDER — LEVOTHYROXINE SODIUM 125 UG/1
125 TABLET ORAL DAILY
Qty: 45 TABLET | Refills: 0 | Status: SHIPPED | OUTPATIENT
Start: 2021-01-05 | End: 2021-03-10

## 2021-01-22 DIAGNOSIS — R39.9 LOWER URINARY TRACT SYMPTOMS (LUTS): ICD-10-CM

## 2021-01-26 RX ORDER — TAMSULOSIN HYDROCHLORIDE 0.4 MG/1
0.4 CAPSULE ORAL DAILY
Qty: 90 CAPSULE | Refills: 1 | Status: SHIPPED | OUTPATIENT
Start: 2021-01-26 | End: 2021-03-10

## 2021-03-08 ASSESSMENT — ENCOUNTER SYMPTOMS
FLANK PAIN: 0
HEMATURIA: 0
DYSURIA: 0
DIFFICULTY URINATING: 1

## 2021-03-10 ENCOUNTER — OFFICE VISIT (OUTPATIENT)
Dept: INTERNAL MEDICINE | Facility: CLINIC | Age: 56
End: 2021-03-10
Payer: COMMERCIAL

## 2021-03-10 VITALS
SYSTOLIC BLOOD PRESSURE: 136 MMHG | BODY MASS INDEX: 27.29 KG/M2 | HEART RATE: 92 BPM | WEIGHT: 187 LBS | DIASTOLIC BLOOD PRESSURE: 87 MMHG | OXYGEN SATURATION: 98 %

## 2021-03-10 DIAGNOSIS — R39.9 LOWER URINARY TRACT SYMPTOMS (LUTS): ICD-10-CM

## 2021-03-10 DIAGNOSIS — D36.9 TUBULAR ADENOMA: ICD-10-CM

## 2021-03-10 DIAGNOSIS — E78.5 HYPERLIPIDEMIA, UNSPECIFIED HYPERLIPIDEMIA TYPE: ICD-10-CM

## 2021-03-10 DIAGNOSIS — R05.9 COUGH: ICD-10-CM

## 2021-03-10 DIAGNOSIS — E03.9 HYPOTHYROIDISM, UNSPECIFIED TYPE: ICD-10-CM

## 2021-03-10 DIAGNOSIS — R73.02 IGT (IMPAIRED GLUCOSE TOLERANCE): ICD-10-CM

## 2021-03-10 DIAGNOSIS — K65.4 MESENTERIC PANNICULITIS (H): Primary | ICD-10-CM

## 2021-03-10 PROCEDURE — 99396 PREV VISIT EST AGE 40-64: CPT | Performed by: INTERNAL MEDICINE

## 2021-03-10 RX ORDER — LEVOTHYROXINE SODIUM 125 UG/1
125 TABLET ORAL DAILY
Qty: 90 TABLET | Refills: 3 | Status: SHIPPED | OUTPATIENT
Start: 2021-03-10 | End: 2022-02-01

## 2021-03-10 RX ORDER — TAMSULOSIN HYDROCHLORIDE 0.4 MG/1
0.8 CAPSULE ORAL DAILY
Qty: 180 CAPSULE | Refills: 3 | Status: SHIPPED | OUTPATIENT
Start: 2021-03-10 | End: 2022-02-01

## 2021-03-10 RX ORDER — MONTELUKAST SODIUM 10 MG/1
10 TABLET ORAL AT BEDTIME
Qty: 90 TABLET | Refills: 3 | Status: SHIPPED | OUTPATIENT
Start: 2021-03-10 | End: 2021-09-22

## 2021-03-10 ASSESSMENT — PAIN SCALES - GENERAL: PAINLEVEL: NO PAIN (0)

## 2021-03-10 NOTE — PROGRESS NOTES
HPI  55-year-old presents today for physical examination.  He reports that over the last 2 years he continues to have intermittent pain and discomfort migratory fashion in the abdomen.  There is no definite precipitating or alleviating factors in association with this tends to bother him more at night when he is laying then during the day.  Said no real change in bowel movements.  He has had increased urinary symptoms and increased lower urinary tract symptoms his AUA score today has increased to 17.  He has had no problems on his present medication.  Is 5 years status post tubular adenoma resection and is due for another colonoscopy.  His weight is up he is monitoring his blood sugars at home periodically and these tend to be running a little higher as well to as his exercise has decreased by his report.  He continues to use the montelukast for seasonal dyspnea and finds that the breathing is worse in the winter and in the cold and he finds relief from the montelukast has been using it now on a as needed basis.  He has occasional stiffness and soreness in the thumbs.  Past Medical History:   Diagnosis Date     Graves' disease     s/p I-131     hemorrhoids      Hyperlipidemia     early metabolic syndrome     Hypothyroidism      IGT (impaired glucose tolerance)      Lower urinary tract symptoms (LUTS) 2018     Tubular adenoma 2018     Past Surgical History:   Procedure Laterality Date     elective circumcision       Family History   Problem Relation Age of Onset     Thyroid Disease Sister         Graves d     Cancer Mother         throat cancer      Thyroid Disease Mother         Graves d     Diabetes Mother      Hypertension Father      Social History     Socioeconomic History     Marital status:      Spouse name: None     Number of children: None     Years of education: None     Highest education level: None   Occupational History     Occupation: research opthalmology     Employer: [employer]    Social Needs     Financial resource strain: None     Food insecurity     Worry: None     Inability: None     Transportation needs     Medical: None     Non-medical: None   Tobacco Use     Smoking status: Never Smoker     Smokeless tobacco: Never Used   Substance and Sexual Activity     Alcohol use: No     Alcohol/week: 0.0 standard drinks     Drug use: No     Sexual activity: None   Lifestyle     Physical activity     Days per week: None     Minutes per session: None     Stress: None   Relationships     Social connections     Talks on phone: None     Gets together: None     Attends Sikhism service: None     Active member of club or organization: None     Attends meetings of clubs or organizations: None     Relationship status: None     Intimate partner violence     Fear of current or ex partner: None     Emotionally abused: None     Physically abused: None     Forced sexual activity: None   Other Topics Concern     Parent/sibling w/ CABG, MI or angioplasty before 65F 55M? Not Asked      Service Not Asked     Blood Transfusions Not Asked     Caffeine Concern Not Asked     Occupational Exposure Not Asked     Hobby Hazards Not Asked     Sleep Concern Not Asked     Stress Concern Not Asked     Weight Concern Not Asked     Special Diet Yes     Comment: vegetarian     Back Care Not Asked     Exercise No     Bike Helmet Not Asked     Seat Belt Not Asked     Self-Exams Not Asked   Social History Narrative     None     Answers for HPI/ROS submitted by the patient on 3/8/2021   General Symptoms: No  Skin Symptoms: No  HENT Symptoms: No  EYE SYMPTOMS: No  HEART SYMPTOMS: No  LUNG SYMPTOMS: No  INTESTINAL SYMPTOMS: No  URINARY SYMPTOMS: Yes  REPRODUCTIVE SYMPTOMS: No  SKELETAL SYMPTOMS: No  BLOOD SYMPTOMS: No  NERVOUS SYSTEM SYMPTOMS: No  MENTAL HEALTH SYMPTOMS: No  Trouble holding urine or incontinence: No  Pain or burning: No  Trouble starting or stopping: Yes  Increased frequency of urination: Yes  Blood in  urine: No  Decreased frequency of urination: No  Frequent nighttime urination: Yes  Flank pain: No  Difficulty emptying bladder: Yes    Exam:  /87 (BP Location: Right arm, Patient Position: Sitting, Cuff Size: Adult Regular)   Pulse 92   Wt 84.8 kg (187 lb)   SpO2 98%   BMI 27.29 kg/m    187 lbs 0 oz  Physical Exam   The patient is alert, oriented with a clear sensorium.   Skin shows no lesions or rashes and good turgor.   Head is normocephalic and atraumatic.   Eyes show PERRLA with benign optic fundi.   Ears show normal TMs bilaterally.   Mouth shows clear oral mucosa.   Neck shows no nodes, thyromegaly or bruits.   Back is non tender.  Lungs are clear to percussion and auscultation.   Heart shows normal S1 and S2 without murmur or gallop.   Abdomen is soft and nontender without masses or organomegaly.   Genitalia show normal testes. No evidence of inguinal hernia.  Extremities show some bony hypertrophy of the CMC joints of both thumbs.  No edema and no evidence of active synovitis.   Neurologic examination shows cranial nerves II-XII intact. Motor shows 5/5 strength. Reflexes are symmetric. Cerebellar testing shows normal tandem gait.  Romberg negative.      ASSESSMENT  1 hypothyroidism contimnue 125 mcg daily  2 hyperlipidemia   3 BPH with increased lower urinary tract symptoms on tamsulosin  4 history of tubular adenoma due for colonoscopy  5 CMC DJD both thumbs  6.Mild seasonal reactive airway disease  7 recurrent abdominal pain with history of mesenteric panniculitis needs reassessment    Plan  Pulmonary follow-up on his CT scan in regards to the mesenteric panniculitis.  We will schedule him for a colonoscopy.  Given his increased AUA score will increase the tamsulosin to 2 tablets at bedtime.  Continue him on the montelukast on a as needed basis.  We will have him use Aleve or ibuprofen for the thumb pain and reassess his fasting labs.    This note was completed using Dragon voice recognition  software.      Ritesh Paniagua MD  General Internal Medicine  [facility]  [phone]

## 2021-03-10 NOTE — NURSING NOTE
Chief Complaint   Patient presents with     Physical     pt here for physical, med refills       Lucero Arora CMA, EMT at 12:59 PM on 3/10/2021.

## 2021-03-10 NOTE — PATIENT INSTRUCTIONS
Radiology to schedule CT scan:  [phone] [facility] and [place]

## 2021-03-11 DIAGNOSIS — E78.5 HYPERLIPIDEMIA, UNSPECIFIED HYPERLIPIDEMIA TYPE: ICD-10-CM

## 2021-03-11 DIAGNOSIS — R73.02 IGT (IMPAIRED GLUCOSE TOLERANCE): ICD-10-CM

## 2021-03-11 DIAGNOSIS — E03.9 HYPOTHYROIDISM, UNSPECIFIED TYPE: ICD-10-CM

## 2021-03-11 DIAGNOSIS — K65.4 MESENTERIC PANNICULITIS (H): ICD-10-CM

## 2021-03-11 LAB
ALBUMIN SERPL-MCNC: 4.2 G/DL (ref 3.4–5)
ALBUMIN UR-MCNC: NEGATIVE MG/DL
ALP SERPL-CCNC: 68 U/L (ref 40–150)
ALT SERPL W P-5'-P-CCNC: 34 U/L (ref 0–70)
ANION GAP SERPL CALCULATED.3IONS-SCNC: 6 MMOL/L (ref 3–14)
APPEARANCE UR: CLEAR
AST SERPL W P-5'-P-CCNC: 18 U/L (ref 0–45)
BASOPHILS # BLD AUTO: 0.1 10E9/L (ref 0–0.2)
BASOPHILS NFR BLD AUTO: 1.1 %
BILIRUB SERPL-MCNC: 1.2 MG/DL (ref 0.2–1.3)
BILIRUB UR QL STRIP: NEGATIVE
BUN SERPL-MCNC: 14 MG/DL (ref 7–30)
CALCIUM SERPL-MCNC: 8.8 MG/DL (ref 8.5–10.1)
CHLORIDE SERPL-SCNC: 107 MMOL/L (ref 94–109)
CHOLEST SERPL-MCNC: 162 MG/DL
CO2 SERPL-SCNC: 27 MMOL/L (ref 20–32)
COLOR UR AUTO: YELLOW
CREAT SERPL-MCNC: 0.9 MG/DL (ref 0.66–1.25)
CRP SERPL-MCNC: <2.9 MG/L (ref 0–8)
DIFFERENTIAL METHOD BLD: ABNORMAL
EOSINOPHIL # BLD AUTO: 0.1 10E9/L (ref 0–0.7)
EOSINOPHIL NFR BLD AUTO: 1.8 %
ERYTHROCYTE [DISTWIDTH] IN BLOOD BY AUTOMATED COUNT: 12.2 % (ref 10–15)
ERYTHROCYTE [SEDIMENTATION RATE] IN BLOOD BY WESTERGREN METHOD: 3 MM/H (ref 0–20)
GFR SERPL CREATININE-BSD FRML MDRD: >90 ML/MIN/{1.73_M2}
GLUCOSE SERPL-MCNC: 118 MG/DL (ref 70–99)
GLUCOSE UR STRIP-MCNC: NEGATIVE MG/DL
HBA1C MFR BLD: 6.1 % (ref 0–5.6)
HCT VFR BLD AUTO: 47.8 % (ref 40–53)
HDLC SERPL-MCNC: 40 MG/DL
HGB BLD-MCNC: 15.6 G/DL (ref 13.3–17.7)
HGB UR QL STRIP: NEGATIVE
IMM GRANULOCYTES # BLD: 0 10E9/L (ref 0–0.4)
IMM GRANULOCYTES NFR BLD: 0.2 %
KETONES UR STRIP-MCNC: NEGATIVE MG/DL
LDLC SERPL CALC-MCNC: 90 MG/DL
LEUKOCYTE ESTERASE UR QL STRIP: NEGATIVE
LYMPHOCYTES # BLD AUTO: 1.2 10E9/L (ref 0.8–5.3)
LYMPHOCYTES NFR BLD AUTO: 26.4 %
MCH RBC QN AUTO: 30.3 PG (ref 26.5–33)
MCHC RBC AUTO-ENTMCNC: 32.6 G/DL (ref 31.5–36.5)
MCV RBC AUTO: 93 FL (ref 78–100)
MONOCYTES # BLD AUTO: 0.3 10E9/L (ref 0–1.3)
MONOCYTES NFR BLD AUTO: 6.1 %
MUCOUS THREADS #/AREA URNS LPF: PRESENT /LPF
NEUTROPHILS # BLD AUTO: 2.9 10E9/L (ref 1.6–8.3)
NEUTROPHILS NFR BLD AUTO: 64.4 %
NITRATE UR QL: NEGATIVE
NONHDLC SERPL-MCNC: 121 MG/DL
NRBC # BLD AUTO: 0 10*3/UL
NRBC BLD AUTO-RTO: 0 /100
PH UR STRIP: 7 PH (ref 5–7)
PLATELET # BLD AUTO: 146 10E9/L (ref 150–450)
POTASSIUM SERPL-SCNC: 3.9 MMOL/L (ref 3.4–5.3)
PROT SERPL-MCNC: 7.3 G/DL (ref 6.8–8.8)
RBC # BLD AUTO: 5.15 10E12/L (ref 4.4–5.9)
RBC #/AREA URNS AUTO: <1 /HPF (ref 0–2)
SODIUM SERPL-SCNC: 140 MMOL/L (ref 133–144)
SOURCE: ABNORMAL
SP GR UR STRIP: 1.01 (ref 1–1.03)
SQUAMOUS #/AREA URNS AUTO: <1 /HPF (ref 0–1)
TRIGL SERPL-MCNC: 155 MG/DL
TSH SERPL DL<=0.005 MIU/L-ACNC: 0.89 MU/L (ref 0.4–4)
UROBILINOGEN UR STRIP-MCNC: 0 MG/DL (ref 0–2)
WBC # BLD AUTO: 4.4 10E9/L (ref 4–11)
WBC #/AREA URNS AUTO: <1 /HPF (ref 0–5)

## 2021-03-11 PROCEDURE — 80050 GENERAL HEALTH PANEL: CPT | Performed by: PATHOLOGY

## 2021-03-11 PROCEDURE — 85652 RBC SED RATE AUTOMATED: CPT | Performed by: PATHOLOGY

## 2021-03-11 PROCEDURE — 83036 HEMOGLOBIN GLYCOSYLATED A1C: CPT | Performed by: PATHOLOGY

## 2021-03-11 PROCEDURE — 81001 URINALYSIS AUTO W/SCOPE: CPT | Performed by: PATHOLOGY

## 2021-03-11 PROCEDURE — 80061 LIPID PANEL: CPT | Performed by: PATHOLOGY

## 2021-03-11 PROCEDURE — 86140 C-REACTIVE PROTEIN: CPT | Performed by: PATHOLOGY

## 2021-03-11 PROCEDURE — 36415 COLL VENOUS BLD VENIPUNCTURE: CPT | Performed by: PATHOLOGY

## 2021-03-12 ENCOUNTER — TELEPHONE (OUTPATIENT)
Dept: GASTROENTEROLOGY | Facility: CLINIC | Age: 56
End: 2021-03-12

## 2021-03-12 NOTE — TELEPHONE ENCOUNTER
Patient is scheduled for COLONOSCOPY with Dr. SOTO    Spoke with: TACO    Date of Procedure: 04/20/2021    Location: [facility]    Sedation Type CS    Conscious Sedation- Needs  for 6 hours after the procedure  MAC/General-Needs  for 24 hours after procedure    Pre-op for Unit J MAC and OR NOT NEEDED    (if yes advise patient they will need a pre-op prior to procedure)      Is patient on blood thinners? -NO (If yes- inform patient to follow up with PCP or provider for follow up instructions)     Informed patient they will need an adult  YES  Cannot take any type of public or medical transportation alone    Informed Patient of COVID Test Requirement YES    Preferred Pharmacy for Pre Prescription NA    Confirmed Nurse will call to complete assessment YES    Additional comments: NA

## 2021-03-18 ENCOUNTER — ANCILLARY PROCEDURE (OUTPATIENT)
Dept: CT IMAGING | Facility: CLINIC | Age: 56
End: 2021-03-18
Attending: INTERNAL MEDICINE
Payer: COMMERCIAL

## 2021-03-18 DIAGNOSIS — K65.4 MESENTERIC PANNICULITIS (H): ICD-10-CM

## 2021-03-18 PROCEDURE — 74177 CT ABD & PELVIS W/CONTRAST: CPT | Performed by: RADIOLOGY

## 2021-03-18 RX ORDER — IOPAMIDOL 755 MG/ML
115 INJECTION, SOLUTION INTRAVASCULAR ONCE
Status: COMPLETED | OUTPATIENT
Start: 2021-03-18 | End: 2021-03-18

## 2021-03-18 RX ADMIN — IOPAMIDOL 115 ML: 755 INJECTION, SOLUTION INTRAVASCULAR at 13:00

## 2021-04-04 DIAGNOSIS — Z11.59 ENCOUNTER FOR SCREENING FOR OTHER VIRAL DISEASES: ICD-10-CM

## 2021-04-07 ENCOUNTER — IMMUNIZATION (OUTPATIENT)
Dept: NURSING | Facility: CLINIC | Age: 56
End: 2021-04-07
Payer: COMMERCIAL

## 2021-04-07 PROCEDURE — 91300 PR COVID VAC PFIZER DIL RECON 30 MCG/0.3 ML IM: CPT

## 2021-04-07 PROCEDURE — 0001A PR COVID VAC PFIZER DIL RECON 30 MCG/0.3 ML IM: CPT

## 2021-04-13 ENCOUNTER — TELEPHONE (OUTPATIENT)
Dept: GASTROENTEROLOGY | Facility: CLINIC | Age: 56
End: 2021-04-13

## 2021-04-16 DIAGNOSIS — Z11.59 ENCOUNTER FOR SCREENING FOR OTHER VIRAL DISEASES: ICD-10-CM

## 2021-04-16 LAB
LABORATORY COMMENT REPORT: NORMAL
SARS-COV-2 RNA RESP QL NAA+PROBE: NEGATIVE
SARS-COV-2 RNA RESP QL NAA+PROBE: NORMAL
SPECIMEN SOURCE: NORMAL
SPECIMEN SOURCE: NORMAL

## 2021-04-16 PROCEDURE — 99000 SPECIMEN HANDLING OFFICE-LAB: CPT | Performed by: PATHOLOGY

## 2021-04-16 PROCEDURE — U0005 INFEC AGEN DETEC AMPLI PROBE: HCPCS | Mod: 90 | Performed by: PATHOLOGY

## 2021-04-16 PROCEDURE — U0003 INFECTIOUS AGENT DETECTION BY NUCLEIC ACID (DNA OR RNA); SEVERE ACUTE RESPIRATORY SYNDROME CORONAVIRUS 2 (SARS-COV-2) (CORONAVIRUS DISEASE [COVID-19]), AMPLIFIED PROBE TECHNIQUE, MAKING USE OF HIGH THROUGHPUT TECHNOLOGIES AS DESCRIBED BY CMS-2020-01-R: HCPCS | Mod: 90 | Performed by: PATHOLOGY

## 2021-04-16 NOTE — TELEPHONE ENCOUNTER
Pre assessment questions completed.     Patient had no questions or concerns at this time.    Sapna Benjamin RN

## 2021-04-20 ENCOUNTER — HOSPITAL ENCOUNTER (OUTPATIENT)
Facility: AMBULATORY SURGERY CENTER | Age: 56
Discharge: HOME | End: 2021-04-20
Attending: INTERNAL MEDICINE | Admitting: INTERNAL MEDICINE
Payer: COMMERCIAL

## 2021-04-20 VITALS
RESPIRATION RATE: 12 BRPM | DIASTOLIC BLOOD PRESSURE: 73 MMHG | SYSTOLIC BLOOD PRESSURE: 115 MMHG | BODY MASS INDEX: 27.2 KG/M2 | OXYGEN SATURATION: 96 % | HEART RATE: 55 BPM | WEIGHT: 190 LBS | HEIGHT: 70 IN | TEMPERATURE: 97.1 F

## 2021-04-20 LAB — COLONOSCOPY: NORMAL

## 2021-04-20 PROCEDURE — 88305 TISSUE EXAM BY PATHOLOGIST: CPT | Mod: GC | Performed by: PATHOLOGY

## 2021-04-20 PROCEDURE — 45380 COLONOSCOPY AND BIOPSY: CPT | Mod: 33

## 2021-04-20 RX ORDER — FENTANYL CITRATE 50 UG/ML
INJECTION, SOLUTION INTRAMUSCULAR; INTRAVENOUS PRN
Status: DISCONTINUED | OUTPATIENT
Start: 2021-04-20 | End: 2021-04-20 | Stop reason: HOSPADM

## 2021-04-20 RX ORDER — ONDANSETRON 4 MG/1
4 TABLET, ORALLY DISINTEGRATING ORAL EVERY 6 HOURS PRN
Status: DISCONTINUED | OUTPATIENT
Start: 2021-04-20 | End: 2021-04-21 | Stop reason: HOSPADM

## 2021-04-20 RX ORDER — FLUMAZENIL 0.1 MG/ML
0.2 INJECTION, SOLUTION INTRAVENOUS
Status: DISCONTINUED | OUTPATIENT
Start: 2021-04-20 | End: 2021-04-21 | Stop reason: HOSPADM

## 2021-04-20 RX ORDER — NALOXONE HYDROCHLORIDE 0.4 MG/ML
0.2 INJECTION, SOLUTION INTRAMUSCULAR; INTRAVENOUS; SUBCUTANEOUS
Status: DISCONTINUED | OUTPATIENT
Start: 2021-04-20 | End: 2021-04-21 | Stop reason: HOSPADM

## 2021-04-20 RX ORDER — NALOXONE HYDROCHLORIDE 0.4 MG/ML
0.4 INJECTION, SOLUTION INTRAMUSCULAR; INTRAVENOUS; SUBCUTANEOUS
Status: DISCONTINUED | OUTPATIENT
Start: 2021-04-20 | End: 2021-04-21 | Stop reason: HOSPADM

## 2021-04-20 RX ORDER — PROCHLORPERAZINE MALEATE 10 MG
10 TABLET ORAL EVERY 6 HOURS PRN
Status: DISCONTINUED | OUTPATIENT
Start: 2021-04-20 | End: 2021-04-21 | Stop reason: HOSPADM

## 2021-04-20 RX ORDER — LIDOCAINE 40 MG/G
CREAM TOPICAL
Status: DISCONTINUED | OUTPATIENT
Start: 2021-04-20 | End: 2021-04-20 | Stop reason: HOSPADM

## 2021-04-20 RX ORDER — SIMETHICONE
LIQUID (ML) MISCELLANEOUS PRN
Status: DISCONTINUED | OUTPATIENT
Start: 2021-04-20 | End: 2021-04-20 | Stop reason: HOSPADM

## 2021-04-20 RX ORDER — ONDANSETRON 2 MG/ML
4 INJECTION INTRAMUSCULAR; INTRAVENOUS
Status: DISCONTINUED | OUTPATIENT
Start: 2021-04-20 | End: 2021-04-20 | Stop reason: HOSPADM

## 2021-04-20 RX ORDER — ONDANSETRON 2 MG/ML
4 INJECTION INTRAMUSCULAR; INTRAVENOUS EVERY 6 HOURS PRN
Status: DISCONTINUED | OUTPATIENT
Start: 2021-04-20 | End: 2021-04-21 | Stop reason: HOSPADM

## 2021-04-20 RX ADMIN — ONDANSETRON 4 MG: 4 TABLET, ORALLY DISINTEGRATING ORAL at 14:44

## 2021-04-20 ASSESSMENT — MIFFLIN-ST. JEOR: SCORE: 1703.08

## 2021-04-21 LAB — COPATH REPORT: NORMAL

## 2021-04-28 ENCOUNTER — IMMUNIZATION (OUTPATIENT)
Dept: NURSING | Facility: CLINIC | Age: 56
End: 2021-04-28
Attending: INTERNAL MEDICINE
Payer: COMMERCIAL

## 2021-04-28 PROCEDURE — 91300 PR COVID VAC PFIZER DIL RECON 30 MCG/0.3 ML IM: CPT

## 2021-04-28 PROCEDURE — 0002A PR COVID VAC PFIZER DIL RECON 30 MCG/0.3 ML IM: CPT

## 2021-06-16 ENCOUNTER — TELEPHONE (OUTPATIENT)
Dept: ALLERGY | Facility: CLINIC | Age: 56
End: 2021-06-16
Payer: COMMERCIAL

## 2021-06-16 NOTE — TELEPHONE ENCOUNTER
[facility] Call Center    Phone Message    May a detailed message be left on voicemail: yes     Reason for Call: Other: Pt sent in an online request to be seen for asthma/seasonal allergy - coughing stuffy nose. Dr De La Torre doesn't see for those reasons, please review. Pt does not have a referral. Thanks!      Action Taken: Message routed to:  [facility] ([facility]): Allergy    Travel Screening: Not Applicable

## 2021-06-24 NOTE — TELEPHONE ENCOUNTER
FUTURE VISIT INFORMATION      FUTURE VISIT INFORMATION:    Date: 9.22.21    Time: 12:30    Location: Video  REFERRAL INFORMATION:    Referring provider:  na    Referring providers clinic:  na    Reason for visit/diagnosis  Seen for asthma/seasonal allergy - coughing stuffy nose    RECORDS REQUESTED FROM:       Clinic name Comments Records Status Imaging Status   [facility] 4.13.21  Dr. Ritesh Paniagua [facility]

## 2021-09-18 ENCOUNTER — HEALTH MAINTENANCE LETTER (OUTPATIENT)
Age: 56
End: 2021-09-18

## 2021-09-22 ENCOUNTER — PRE VISIT (OUTPATIENT)
Dept: ALLERGY | Facility: CLINIC | Age: 56
End: 2021-09-22

## 2021-09-22 ENCOUNTER — VIRTUAL VISIT (OUTPATIENT)
Dept: ALLERGY | Facility: CLINIC | Age: 56
End: 2021-09-22
Payer: COMMERCIAL

## 2021-09-22 DIAGNOSIS — R05.3 CHRONIC COUGH: Primary | ICD-10-CM

## 2021-09-22 DIAGNOSIS — J30.89 NON-SEASONAL ALLERGIC RHINITIS DUE TO OTHER ALLERGIC TRIGGER: ICD-10-CM

## 2021-09-22 PROCEDURE — 99203 OFFICE O/P NEW LOW 30 MIN: CPT | Mod: 95 | Performed by: DERMATOLOGY

## 2021-09-22 NOTE — PROGRESS NOTES
[facility] Dermato-allergology Note  Virtual visit: store and forward video ([employer] connected) Zafar  Encounter Date: Sep 22, 2021  ____________________________________________    CC: No chief complaint on file.      HPI:  Mr. Alexa Shaikh is a(n) 56 year old male who presents today as a new patient for allergy consultation  - Last July 20 started coughing just before a talk. COVID test was negative. Was for months on Montelukast and in November 20 again flare up with coughing and then added Ventolin.  - Could not sleep any more and Loratidine and Benadryl seems to help.  - In the moment no coughing, but in the morning some stuffy, runny nose = Loratidine every morning  - otherwise feeling well in usual state of health    Physical exam:  General: In no acute distress, well-developed, well-nourished  Eyes: no conjunctivitis  ENT: no signs of rhinitis   Pulmonary: no wheezing or coughing  Skin: not relevant      Past Medical History:   Patient Active Problem List   Diagnosis     hemorrhoids     Hypothyroidism     Hyperlipidemia     IGT (impaired glucose tolerance)     Lower urinary tract symptoms (LUTS)     Tubular adenoma     Past Medical History:   Diagnosis Date     Graves' disease     s/p I-131     hemorrhoids      Hyperlipidemia     early metabolic syndrome     Hypothyroidism      IGT (impaired glucose tolerance)      Lower urinary tract symptoms (LUTS) 1/17/2018     Tubular adenoma 03/2018       Allergies:  Allergies   Allergen Reactions     Omeprazole      Rectal bleeding       Medications:  Current Outpatient Medications   Medication     albuterol (PROAIR HFA/PROVENTIL HFA/VENTOLIN HFA) 108 (90 Base) MCG/ACT inhaler     levothyroxine (SYNTHROID/LEVOTHROID) 125 MCG tablet     montelukast (SINGULAIR) 10 MG tablet     tamsulosin (FLOMAX) 0.4 MG capsule     No current facility-administered medications for this visit.       Social History:  The patient works as a PhD in Ophthalmology. Patient  has the following hobbies or non-occupational exposure     Family History:  Family History   Problem Relation Age of Onset     Thyroid Disease Sister         Graves d     Cancer Mother         throat cancer      Thyroid Disease Mother         Graves d     Diabetes Mother      Hypertension Father      Diabetes Brother    sister has seasonal RC    Previous Labs, Allergy Tests, Dermatopathology, Imaging:  none    Referred By: Referred Self, MD  No address on file     Allergy Tests:    Past Allergy Test    Order for Future Allergy Testing:    [x] Outpatient  [] Inpatient: [facility]..../ Bed ....       Skin Atopy (atopic dermatitis) [] Yes   [x] No .........  Contact allergies:   [] Yes   [x] No ..........  Hand eczema:   [] Yes   [] No           Leading hand:   [] R   [] L       [] Ambidextrous         Drug allergies:        [x] Yes   [] No  Which?.Omeprazole = rectal bleeding    Urticaria/Angioedema  [] Yes   [x] No .........  Food Allergy:  [] Yes   [x] No  which?......  Pets :  [] Yes   [x] No  Which?..no problems with dog or cat         [x]  Rhinitis   [] Conjunctivitis   [] Sinusitis   [] Polyposis   [] Otitis   [] Pharyngitis         []  none  Operations:   [] Tonsils   [] Septum   [] Sinus   [] Polyposis        [] Asthma bronchiale   [x] Coughing      []  none  Symptoms (mostly Rhinoconjunctivitis and Asthma) aggravated by:  Season   [] I   [] II   [] III   [] IV   []V   []VI   []VII   []VIII   []IX   []X   []XI   []XI     [x] perennial   Day time      [x] morning   [] noon      [] evening        [] night    [] whole day........  []  none  Location/changes    [x] inside        [] outside   [] mountains    [] sea     [] others.............   []  none  Triggers, specific     [] animals     [] plants     [] dust              [] others ...........................    []  none  Triggers, others       [] work          [] psyche    [] sport            [] others .............................  []  none  Irritant                 [] phys efforts [] smoke    [] heat/cold     [] odors  []others............... []  none  In the age of 10 to 16 chronic rhinitis = after arriving [place] in [place] problems gone      Order for PRICK TESTS    Reason for tests (suspected allergy): allergic coughing and morning Rhinitis  Known previous allergies: none    Standardized prick panels  [x] Atopic panel (20 tests)  [] Pediatric Panel (12 tests)  [] Milk, Meat, Eggs, Grains (20 tests)   [] Dust, Epithelia, Feathers (10 tests)  [] Fish, Seafood, Shellfish (17 tests)  [] Nuts, Beans (14 tests)  [] Spice, Vegetable, Fruit (17 tests)  [] Pollen Panel = Tree, Grass, Weed (24 tests)  [] Others: ...      [] Patient's own products: ...    DO NOT test if chemical or biological identity is unknown!     always ask from patient the product information and safety sheets (MSDS)     Standardized intradermal tests  [x] Penicillium notatum [x] Aspergillus fumigatus [x] House dust mites D.far & D. pteron  [] Cat    [] dog  [] Others: ...  [] Bee venom   [] Wasp venom  !!Specific protocol with dilutions!!         [] Patient needs consultation with Allergy team (changes of tests may apply)  [x] Tests discussed with Allergy team (can have direct appointment for allergy tests)     Atopy Screen (Placed Sept 27 2021)    No Substance Readings (15 min) Evaluation   POS Histamine 1mg/ml -    NEG NaCl 0.9% -      No Substance Readings (15 min) Evaluation   1 Alternaria alternata (tenuis)  -    2 Cladosporium herbarum -    3 Aspergillus fumigatus -    4 Penicillium notatum -    5 Dermatophagoides pteronyssinus -    6 Dermatophagoides farinae -    7 Dog epithelium (canis spp) -    8 Cat hair (mitesh catus) -    9 Cockroach   (Blatella americana & germanica) -    10 Grass mix midwest   (Mary Lou, Orchard, Redtop, Shekhar) -    11 Doc grass (sorghum halepense) -    12 Weed mix   (common Cocklebur, Lamb s quarters, rough redroot Pigweed, Dock/Sorrel) -    13 Mug wort (artemisia vulgare) -    14  Ragweed giant/short (ambrosia spp) -    15 White birch (Betula papyrifera) -    16 Tree mix 1 (Pecan, Maple BHR, Oak RVW, american [place], black [place]) -    17 Red cedar (juniperus [place]) -    18 Tree mix 2   (white Oral, river/red Birch, black [place], common [place], american Elm) -    19 Box elder/Maple mix (acer spp) -    20 [place] shagbark (carya ovata) -           Conclusion      Intradermal Testing (Placed 09/27/21)    No Substance Conc.  Reading (15min)  immediate Papule [mm] / Erythema [mm] Reading   (... days)  delayed Papule [mm] / Erythema [mm] Remarks   - NaCl  0.9% -   -    + Histamine (prick) 0.1mg/ml -   -    DF Standard Dust Mite - D. Farinae 1:10 -   -    DP Standard Dust Mite - D. Pteronyssinus 1:10 -   -    A Aspergillus fumigatus  1:10 -   -    P Penicillium notatum 1:10 -   -      1:10 -   -      1:10 -   -    Conclusions:     ________________________________    Assessment & Plan:    ==> Final Diagnosis:     # perennial coughing and rhinitis  DDx allergic to HDM vs irritant/bronchial hyperreactivity  * chronic illness with exacerbation, progression, side effects from treatment      These conclusions are made at the best of one's knowledge and belief based on the provided evidence such as patient's history and allergy test results and they can change over time or can be incomplete because of missing information's.    ==> Treatment Plan:  >> prick and IDT to rule out perennial allergy (HDM or molds)  >> maybe evaluate then treatment with Symbicort or Advair      Staff:  Provider    Follow-up in Derm-Allergy clinic for prick and IDT (stop Loratadine 5 days earlier) == Appt Monday 27th of September. Stop Loratadine today    Start time: 12:40 AM  End time: 1:00 PM    I spent a total of 20 minutes with Alexa Shaikh. This time was spent counseling the patient and/or coordinating care, explaining the allergy tests, performing allergy tests and assessing the clinical relevance.

## 2021-09-22 NOTE — NURSING NOTE
Chief Complaint   Patient presents with     Allergy Consult     Alexa is being seen today for allergies and possible asthma.       Caro Herbert RN

## 2021-09-22 NOTE — LETTER
9/22/2021         RE: Alexa Shaikh  [address]  [place] [facility] [address]        Dear Colleague,    Thank you for referring your patient, Alexa Shaikh, to the [facility] [place]. Please see a copy of my visit note below.    [facility] Dermato-allergology Note  Virtual visit: store and forward video ([employer] connected) [employer]im[employer]  Encounter Date: Sep 22, 2021  ____________________________________________    CC: No chief complaint on file.      HPI:  Mr. Alexa Shaikh is a(n) 56 year old male who presents today as a new patient for allergy consultation  - Last July 20 started coughing just before a talk. COVID test was negative. Was for months on Montelukast and in November 20 again flare up with coughing and then added Ventolin.  - Could not sleep any more and Loratidine and Benadryl seems to help.  - In the moment no coughing, but in the morning some stuffy, runny nose = Loratidine every morning  - otherwise feeling well in usual state of health    Physical exam:  General: In no acute distress, well-developed, well-nourished  Eyes: no conjunctivitis  ENT: no signs of rhinitis   Pulmonary: no wheezing or coughing  Skin: not relevant      Past Medical History:   Patient Active Problem List   Diagnosis     hemorrhoids     Hypothyroidism     Hyperlipidemia     IGT (impaired glucose tolerance)     Lower urinary tract symptoms (LUTS)     Tubular adenoma     Past Medical History:   Diagnosis Date     Graves' disease     s/p I-131     hemorrhoids      Hyperlipidemia     early metabolic syndrome     Hypothyroidism      IGT (impaired glucose tolerance)      Lower urinary tract symptoms (LUTS) 1/17/2018     Tubular adenoma 03/2018       Allergies:  Allergies   Allergen Reactions     Omeprazole      Rectal bleeding       Medications:  Current Outpatient Medications   Medication     albuterol (PROAIR HFA/PROVENTIL HFA/VENTOLIN HFA) 108 (90 Base) MCG/ACT inhaler     levothyroxine  (SYNTHROID/LEVOTHROID) 125 MCG tablet     montelukast (SINGULAIR) 10 MG tablet     tamsulosin (FLOMAX) 0.4 MG capsule     No current facility-administered medications for this visit.       Social History:  The patient works as a PhD in Ophthalmology. Patient has the following hobbies or non-occupational exposure     Family History:  Family History   Problem Relation Age of Onset     Thyroid Disease Sister         Graves d     Cancer Mother         throat cancer      Thyroid Disease Mother         Graves d     Diabetes Mother      Hypertension Father      Diabetes Brother    sister has seasonal RC    Previous Labs, Allergy Tests, Dermatopathology, Imaging:  none    Referred By: Referred Self, MD  No address on file     Allergy Tests:    Past Allergy Test    Order for Future Allergy Testing:    [x] Outpatient  [] Inpatient: [facility]..../ Bed ....       Skin Atopy (atopic dermatitis) [] Yes   [x] No .........  Contact allergies:   [] Yes   [x] No ..........  Hand eczema:   [] Yes   [] No           Leading hand:   [] R   [] L       [] Ambidextrous         Drug allergies:        [x] Yes   [] No  Which?.Omeprazole = rectal bleeding    Urticaria/Angioedema  [] Yes   [x] No .........  Food Allergy:  [] Yes   [x] No  which?......  Pets :  [] Yes   [x] No  Which?..no problems with dog or cat         [x]  Rhinitis   [] Conjunctivitis   [] Sinusitis   [] Polyposis   [] Otitis   [] Pharyngitis         []  none  Operations:   [] Tonsils   [] Septum   [] Sinus   [] Polyposis        [] Asthma bronchiale   [x] Coughing      []  none  Symptoms (mostly Rhinoconjunctivitis and Asthma) aggravated by:  Season   [] I   [] II   [] III   [] IV   []V   []VI   []VII   []VIII   []IX   []X   []XI   []XI     [x] perennial   Day time      [x] morning   [] noon      [] evening        [] night    [] whole day........  []  none  Location/changes    [x] inside        [] outside   [] mountains    [] sea     [] others.............   []  none  Triggers,  specific     [] animals     [] plants     [] dust              [] others ...........................    []  none  Triggers, others       [] work          [] psyche    [] sport            [] others .............................  []  none  Irritant                [] phys efforts [] smoke    [] heat/cold     [] odors  []others............... []  none  In the age of 10 to 16 chronic rhinitis = after arriving 1991 in  problems gone      Order for PRICK TESTS    Reason for tests (suspected allergy): allergic coughing and morning Rhinitis  Known previous allergies: none    Standardized prick panels  [x] Atopic panel (20 tests)  [] Pediatric Panel (12 tests)  [] Milk, Meat, Eggs, Grains (20 tests)   [] Dust, Epithelia, Feathers (10 tests)  [] Fish, Seafood, Shellfish (17 tests)  [] Nuts, Beans (14 tests)  [] Spice, Vegetable, Fruit (17 tests)  [] Pollen Panel = Tree, Grass, Weed (24 tests)  [] Others: ...      [] Patient's own products: ...    DO NOT test if chemical or biological identity is unknown!     always ask from patient the product information and safety sheets (MSDS)     Standardized intradermal tests  [x] Penicillium notatum [x] Aspergillus fumigatus [x] House dust mites D.far & D. pteron  [] Cat    [] dog  [] Others: ...  [] Bee venom   [] Wasp venom  !!Specific protocol with dilutions!!         [] Patient needs consultation with Allergy team (changes of tests may apply)  [x] Tests discussed with Allergy team (can have direct appointment for allergy tests)     Atopy Screen (Placed Sept 27 2021)    No Substance Readings (15 min) Evaluation   POS Histamine 1mg/ml -    NEG NaCl 0.9% -      No Substance Readings (15 min) Evaluation   1 Alternaria alternata (tenuis)  -    2 Cladosporium herbarum -    3 Aspergillus fumigatus -    4 Penicillium notatum -    5 Dermatophagoides pteronyssinus -    6 Dermatophagoides farinae -    7 Dog epithelium (canis spp) -    8 Cat hair (mitesh catus) -    9 Cockroach   (Blatella  americana & germanica) -    10 Grass mix midwest   (Mary Lou, Orchard, Redtop, [place]khar) -    11 Doc grass (sorghum halepense) -    12 Weed mix   (common Cocklebur, Lamb s quarters, rough redroot Pigweed, Dock/Sorrel) -    13 Mug wort (artemisia vulgare) -    14 Ragweed giant/short (ambrosia spp) -    15 White birch (Betula papyrifera) -    16 Tree mix 1 (Pecan, Maple BHR, Oak RVW, american [place], black [place]) -    17 Red cedar (juniperus [place]) -    18 Tree mix 2   (white Oral, river/red Birch, black [place], common [place], american Elm) -    19 Box elder/Maple mix (acer spp) -    20 [place] shagbark (carya ovata) -           Conclusion      Intradermal Testing (Placed 09/27/21)    No Substance Conc.  Reading (15min)  immediate Papule [mm] / Erythema [mm] Reading   (... days)  delayed Papule [mm] / Erythema [mm] Remarks   - NaCl  0.9% -   -    + Histamine (prick) 0.1mg/ml -   -    DF Standard Dust Mite - D. Farinae 1:10 -   -    DP Standard Dust Mite - D. Pteronyssinus 1:10 -   -    A Aspergillus fumigatus  1:10 -   -    P Penicillium notatum 1:10 -   -      1:10 -   -      1:10 -   -    Conclusions:     ________________________________    Assessment & Plan:    ==> Final Diagnosis:     # perennial coughing and rhinitis  DDx allergic to HDM vs irritant/bronchial hyperreactivity  * chronic illness with exacerbation, progression, side effects from treatment      These conclusions are made at the best of one's knowledge and belief based on the provided evidence such as patient's history and allergy test results and they can change over time or can be incomplete because of missing information's.    ==> Treatment Plan:  >> prick and IDT to rule out perennial allergy (HDM or molds)  >> maybe evaluate then treatment with Symbicort or Advair      Staff:  Provider    Follow-up in Derm-Allergy clinic for prick and IDT (stop Loratadine 5 days earlier) == Appt Monday 27th of September. Stop Loratadine today    Start  time: 12:40 AM  End time: 1:00 PM    I spent a total of 20 minutes with Alexa Shaikh. This time was spent counseling the patient and/or coordinating care, explaining the allergy tests, performing allergy tests and assessing the clinical relevance.          Again, thank you for allowing me to participate in the care of your patient.        Sincerely,        Azam De La Torre MD

## 2021-09-25 NOTE — PROGRESS NOTES
[facility] Dermato-allergology Note  Office visit  Encounter Date: Sep 27, 2021  ____________________________________________    CC: No chief complaint on file.      HPI:  Mr. Alexa Shaikh is a(n) 56 year old male who presents today as a return patient for allergy consultation  - Follow-up in Derm-Allergy clinic for prick and IDT (stop Loratadine 5 days earlier) == Appt Monday 27th of September. Stop Loratadine today  - otherwise feeling well in usual state of health    Physical exam:  General: In no acute distress, well-developed, well-nourished  Eyes: no conjunctivitis  ENT: no signs of rhinitis   Pulmonary: no wheezing or coughing  Skin:Focused examination of the skin on test sites was performed = see test results below    Earlier History and Allergy exams:  - Last July 20 started coughing just before a talk. COVID test was negative. Was for months on Montelukast and in November 20 again flare up with coughing and then added Ventolin.  - Could not sleep any more and Loratidine and Benadryl seems to help.  - In the moment no coughing, but in the morning some stuffy, runny nose = Loratidine every morning  - otherwise feeling well in usual state of health      Past Medical History:   Patient Active Problem List   Diagnosis     hemorrhoids     Hypothyroidism     Hyperlipidemia     IGT (impaired glucose tolerance)     Lower urinary tract symptoms (LUTS)     Tubular adenoma     Past Medical History:   Diagnosis Date     Graves' disease     s/p I-131     hemorrhoids      Hyperlipidemia     early metabolic syndrome     Hypothyroidism      IGT (impaired glucose tolerance)      Lower urinary tract symptoms (LUTS) 1/17/2018     Tubular adenoma 03/2018       Allergies:  Allergies   Allergen Reactions     Omeprazole      Rectal bleeding       Medications:  Current Outpatient Medications   Medication     levothyroxine (SYNTHROID/LEVOTHROID) 125 MCG tablet     tamsulosin (FLOMAX) 0.4 MG capsule     No current  facility-administered medications for this visit.       Social History:  The patient works as a PhD in Ophthalmology. Patient has the following hobbies or non-occupational exposure     Family History:  Family History   Problem Relation Age of Onset     Thyroid Disease Sister         Graves d     Cancer Mother         throat cancer      Thyroid Disease Mother         Graves d     Diabetes Mother      Hypertension Father      Diabetes Brother    sister has seasonal RC    Previous Labs, Allergy Tests, Dermatopathology, Imaging:  none    Referred By: Referred Self, MD  No address on file     Allergy Tests:    Past Allergy Test    Order for Future Allergy Testing:    [x] Outpatient  [] Inpatient: [facility]..../ Bed ....       Skin Atopy (atopic dermatitis) [] Yes   [x] No .........  Contact allergies:   [] Yes   [x] No ..........  Hand eczema:   [] Yes   [] No           Leading hand:   [] R   [] L       [] Ambidextrous         Drug allergies:        [x] Yes   [] No  Which?.Omeprazole = rectal bleeding    Urticaria/Angioedema  [] Yes   [x] No .........  Food Allergy:  [] Yes   [x] No  which?......  Pets :  [] Yes   [x] No  Which?..no problems with dog or cat         [x]  Rhinitis   [] Conjunctivitis   [] Sinusitis   [] Polyposis   [] Otitis   [] Pharyngitis         []  none  Operations:   [] Tonsils   [] Septum   [] Sinus   [] Polyposis        [] Asthma bronchiale   [x] Coughing      []  none  Symptoms (mostly Rhinoconjunctivitis and Asthma) aggravated by:  Season   [] I   [] II   [] III   [] IV   []V   []VI   []VII   []VIII   []IX   []X   []XI   []XI     [x] perennial   Day time      [x] morning   [] noon      [] evening        [] night    [] whole day........  []  none  Location/changes    [x] inside        [] outside   [] mountains    [] sea     [] others.............   []  none  Triggers, specific     [] animals     [] plants     [] dust              [] others ...........................    []  none  Triggers, others        [] work          [] psyche    [] sport            [] others .............................  []  none  Irritant                [] phys efforts [] smoke    [] heat/cold     [] odors  []others............... []  none  In the age of 10 to 16 chronic rhinitis = after arriving [place] in [place] problems gone      Order for PRICK TESTS    Reason for tests (suspected allergy): allergic coughing and morning Rhinitis  Known previous allergies: none    Standardized prick panels  [x] Atopic panel (20 tests)  [] Pediatric Panel (12 tests)  [] Milk, Meat, Eggs, Grains (20 tests)   [] Dust, Epithelia, Feathers (10 tests)  [] Fish, Seafood, Shellfish (17 tests)  [] Nuts, Beans (14 tests)  [] Spice, Vegetable, Fruit (17 tests)  [] Pollen Panel = Tree, Grass, Weed (24 tests)  [] Others: ...      [] Patient's own products: ...    DO NOT test if chemical or biological identity is unknown!     always ask from patient the product information and safety sheets (MSDS)     Standardized intradermal tests  [x] Penicillium notatum [x] Aspergillus fumigatus [x] House dust mites D.far & D. pteron  [] Cat    [] dog  [] Others: ...  [] Bee venom   [] Wasp venom  !!Specific protocol with dilutions!!         Atopy Screen (Placed Sept 27 2021)    No Substance Readings (15 min) Evaluation   POS Histamine 1mg/ml ++    NEG NaCl 0.9% -      No Substance Readings (15 min) Evaluation   1 Alternaria alternata (tenuis)  -    2 Cladosporium herbarum -    3 Aspergillus fumigatus -    4 Penicillium notatum -    5 Dermatophagoides pteronyssinus -    6 Dermatophagoides farinae -    7 Dog epithelium (canis spp) -    8 Cat hair (mitesh catus) -    9 Cockroach   (Blatella americana & germanica) -    10 Grass mix midwest   (Mary Lou, Orchard, Redtop, Shekhar) -    11 Doc grass (sorghum halepense) -    12 Weed mix   (common Cocklebur, Lamb s quarters, rough redroot Pigweed, Dock/Sorrel) -    13 Mug wort (artemisia vulgare) -    14 Ragweed giant/short (ambrosia spp) -    15  White birch (Betula papyrifera) -    16 Tree mix 1 (Pecan, Maple BHR, Oak RVW, american [place], black [place]) -    17 Red cedar (juniperus virginia) -    18 Tree mix 2   (white Oral, river/red Birch, black [place], common [place], american Elm) -    19 Box elder/Maple mix (acer spp) -    20 Pillow shagbark (carya ovata) -           Conclusion: no signs for specific reactions in prick tests      Intradermal Testing (Placed 09/27/21)    No Substance Conc.  Reading (15min)  immediate Papule [mm] / Erythema [mm] Reading   (... days)  delayed Papule [mm] / Erythema [mm] Remarks   DF Standard Dust Mite - D. Farinae 1:10 -   -    DP Standard Dust Mite - D. Pteronyssinus 1:10 -   -    A Aspergillus fumigatus  1:10 -   -    P Penicillium notatum 1:10 -   -    Conclusions: no specific immediate type reaction with negative NaCl control. Patient will follow up if any delayed reaction.    ________________________________    Assessment & Plan:    ==> Final Diagnosis:     # perennial coughing and rhinitis    In prick and intradermal tests no signs for specific immediate type allergic reaction    Patient will feed back if any delayed reaction  most likely non-allergic rhinitis with postnasal drip and consequent coughing  * chronic illness with exacerbation, progression, side effects from treatment      These conclusions are made at the best of one's knowledge and belief based on the provided evidence such as patient's history and allergy test results and they can change over time or can be incomplete because of missing information's.    ==> Treatment Plan:  >> patient will feed back if delayed reaction  >> take blood for total IgE and spec IgE dust mites  >> continue Loratadine in the evening and add Nasonex nasal spray every evening for about 1 month  >> if no delayed reaction then get patient checked by ENT      Procedures Performed: Allergy tests, including prick, intradermal tests,    Staff: : provider    Follow-up in  Derm-Allergy clinic if delayed reaction  ___________________________      I spent a total of 20 minutes with Alexa Shaikh during today s  visit. This time was spent discussing all the individual test results, correlating them to the clinical relevance, counseling the patient and/or coordinating care and performing allergy tests or procedures.

## 2021-09-27 ENCOUNTER — LAB (OUTPATIENT)
Dept: LAB | Facility: CLINIC | Age: 56
End: 2021-09-27
Payer: COMMERCIAL

## 2021-09-27 ENCOUNTER — OFFICE VISIT (OUTPATIENT)
Dept: ALLERGY | Facility: CLINIC | Age: 56
End: 2021-09-27
Payer: COMMERCIAL

## 2021-09-27 DIAGNOSIS — R09.82 ALLERGIC RHINITIS WITH POSTNASAL DRIP: ICD-10-CM

## 2021-09-27 DIAGNOSIS — J30.9 ALLERGIC RHINITIS WITH POSTNASAL DRIP: ICD-10-CM

## 2021-09-27 DIAGNOSIS — R05.3 CHRONIC COUGH: Primary | ICD-10-CM

## 2021-09-27 DIAGNOSIS — J30.89 NON-SEASONAL ALLERGIC RHINITIS DUE TO OTHER ALLERGIC TRIGGER: ICD-10-CM

## 2021-09-27 DIAGNOSIS — R05.3 CHRONIC COUGH: ICD-10-CM

## 2021-09-27 PROCEDURE — 95004 PERQ TESTS W/ALRGNC XTRCS: CPT | Performed by: DERMATOLOGY

## 2021-09-27 PROCEDURE — 95024 IQ TESTS W/ALLERGENIC XTRCS: CPT | Performed by: DERMATOLOGY

## 2021-09-27 PROCEDURE — 36415 COLL VENOUS BLD VENIPUNCTURE: CPT | Performed by: PATHOLOGY

## 2021-09-27 PROCEDURE — 99213 OFFICE O/P EST LOW 20 MIN: CPT | Mod: 25 | Performed by: DERMATOLOGY

## 2021-09-27 PROCEDURE — 82785 ASSAY OF IGE: CPT | Mod: 90 | Performed by: PATHOLOGY

## 2021-09-27 PROCEDURE — 86003 ALLG SPEC IGE CRUDE XTRC EA: CPT | Mod: 90 | Performed by: PATHOLOGY

## 2021-09-27 RX ORDER — MOMETASONE FUROATE MONOHYDRATE 50 UG/1
1 SPRAY, METERED NASAL DAILY
Qty: 17 G | Refills: 3 | Status: SHIPPED | OUTPATIENT
Start: 2021-09-27 | End: 2023-02-22

## 2021-09-27 ASSESSMENT — PAIN SCALES - GENERAL: PAINLEVEL: NO PAIN (0)

## 2021-09-27 NOTE — Clinical Note
9/27/2021         RE: Alexa Shaikh  [address]  [place] [address]        Dear Colleague,    Thank you for referring your patient, Alexa Shaikh, to the [facility] [place]. Please see a copy of my visit note below.    [facility] Dermato-allergology Note  Office visit  Encounter Date: Sep 27, 2021  ____________________________________________    CC: No chief complaint on file.      HPI:  Mr. Alexa hSaikh is a(n) 56 year old male who presents today as a return patient for allergy consultation  - Follow-up in Derm-Allergy clinic for prick and IDT (stop Loratadine 5 days earlier) == Appt Monday 27th of September. Stop Loratadine today  - otherwise feeling well in usual state of health    Physical exam:  General: In no acute distress, well-developed, well-nourished  Eyes: no conjunctivitis  ENT: no signs of rhinitis   Pulmonary: no wheezing or coughing  Skin:Focused examination of the skin on test sites was performed = see test results below    Earlier History and Allergy exams:  - Last July 20 started coughing just before a talk. COVID test was negative. Was for months on Montelukast and in November 20 again flare up with coughing and then added Ventolin.  - Could not sleep any more and Loratidine and Benadryl seems to help.  - In the moment no coughing, but in the morning some stuffy, runny nose = Loratidine every morning  - otherwise feeling well in usual state of health      Past Medical History:   Patient Active Problem List   Diagnosis     hemorrhoids     Hypothyroidism     Hyperlipidemia     IGT (impaired glucose tolerance)     Lower urinary tract symptoms (LUTS)     Tubular adenoma     Past Medical History:   Diagnosis Date     Graves' disease     s/p I-131     hemorrhoids      Hyperlipidemia     early metabolic syndrome     Hypothyroidism      IGT (impaired glucose tolerance)      Lower urinary tract symptoms (LUTS) 1/17/2018     Tubular adenoma 03/2018        Allergies:  Allergies   Allergen Reactions     Omeprazole      Rectal bleeding       Medications:  Current Outpatient Medications   Medication     levothyroxine (SYNTHROID/LEVOTHROID) 125 MCG tablet     tamsulosin (FLOMAX) 0.4 MG capsule     No current facility-administered medications for this visit.       Social History:  The patient works as a PhD in Ophthalmology. Patient has the following hobbies or non-occupational exposure     Family History:  Family History   Problem Relation Age of Onset     Thyroid Disease Sister         Graves d     Cancer Mother         throat cancer      Thyroid Disease Mother         Graves d     Diabetes Mother      Hypertension Father      Diabetes Brother    sister has seasonal RC    Previous Labs, Allergy Tests, Dermatopathology, Imaging:  none    Referred By: Referred Self, MD  No address on file     Allergy Tests:    Past Allergy Test    Order for Future Allergy Testing:    [x] Outpatient  [] Inpatient: [facility]..../ Bed ....       Skin Atopy (atopic dermatitis) [] Yes   [x] No .........  Contact allergies:   [] Yes   [x] No ..........  Hand eczema:   [] Yes   [] No           Leading hand:   [] R   [] L       [] Ambidextrous         Drug allergies:        [x] Yes   [] No  Which?.Omeprazole = rectal bleeding    Urticaria/Angioedema  [] Yes   [x] No .........  Food Allergy:  [] Yes   [x] No  which?......  Pets :  [] Yes   [x] No  Which?..no problems with dog or cat         [x]  Rhinitis   [] Conjunctivitis   [] Sinusitis   [] Polyposis   [] Otitis   [] Pharyngitis         []  none  Operations:   [] Tonsils   [] Septum   [] Sinus   [] Polyposis        [] Asthma bronchiale   [x] Coughing      []  none  Symptoms (mostly Rhinoconjunctivitis and Asthma) aggravated by:  Season   [] I   [] II   [] III   [] IV   []V   []VI   []VII   []VIII   []IX   []X   []XI   []XI     [x] perennial   Day time      [x] morning   [] noon      [] evening        [] night    [] whole day........  []   none  Location/changes    [x] inside        [] outside   [] mountains    [] sea     [] others.............   []  none  Triggers, specific     [] animals     [] plants     [] dust              [] others ...........................    []  none  Triggers, others       [] work          [] psyche    [] sport            [] others .............................  []  none  Irritant                [] phys efforts [] smoke    [] heat/cold     [] odors  []others............... []  none  In the age of 10 to 16 chronic rhinitis = after arriving 1991 in  problems gone      Order for PRICK TESTS    Reason for tests (suspected allergy): allergic coughing and morning Rhinitis  Known previous allergies: none    Standardized prick panels  [x] Atopic panel (20 tests)  [] Pediatric Panel (12 tests)  [] Milk, Meat, Eggs, Grains (20 tests)   [] Dust, Epithelia, Feathers (10 tests)  [] Fish, Seafood, Shellfish (17 tests)  [] Nuts, Beans (14 tests)  [] Spice, Vegetable, Fruit (17 tests)  [] Pollen Panel = Tree, Grass, Weed (24 tests)  [] Others: ...      [] Patient's own products: ...    DO NOT test if chemical or biological identity is unknown!     always ask from patient the product information and safety sheets (MSDS)     Standardized intradermal tests  [x] Penicillium notatum [x] Aspergillus fumigatus [x] House dust mites D.far & D. pteron  [] Cat    [] dog  [] Others: ...  [] Bee venom   [] Wasp venom  !!Specific protocol with dilutions!!         Atopy Screen (Placed Sept 27 2021)    No Substance Readings (15 min) Evaluation   POS Histamine 1mg/ml ++    NEG NaCl 0.9% -      No Substance Readings (15 min) Evaluation   1 Alternaria alternata (tenuis)  -    2 Cladosporium herbarum -    3 Aspergillus fumigatus -    4 Penicillium notatum -    5 Dermatophagoides pteronyssinus -    6 Dermatophagoides farinae -    7 Dog epithelium (canis spp) -    8 Cat hair (mitesh catus) -    9 Cockroach   (Blatella americana & germanica) -    10 Grass mix  midwest   (Mary Lou, Orchard, Redtop, Shekhar) -    11 Doc grass (sorghum halepense) -    12 Weed mix   (common Cocklebur, Lamb s quarters, rough redroot Pigweed, Dock/Sorrel) -    13 Mug wort (artemisia vulgare) -    14 Ragweed giant/short (ambrosia spp) -    15 White birch (Betula papyrifera) -    16 Tree mix 1 (Pecan, Maple BHR, Oak RVW, american [place], black [place]) -    17 Red cedar (juniperus virginia) -    18 Tree mix 2   (white Oral, river/red Birch, black [place], common [place], american Elm) -    19 Box elder/Maple mix (acer spp) -    20 [place] shagbark (carya ovata) -           Conclusion: no signs for specific reactions in prick tests      Intradermal Testing (Placed 09/27/21)    No Substance Conc.  Reading (15min)  immediate Papule [mm] / Erythema [mm] Reading   (... days)  delayed Papule [mm] / Erythema [mm] Remarks   DF Standard Dust Mite - D. Farinae 1:10 -   -    DP Standard Dust Mite - D. Pteronyssinus 1:10 -   -    A Aspergillus fumigatus  1:10 -   -    P Penicillium notatum 1:10 -   -    Conclusions: no specific immediate type reaction with negative NaCl control. Patient will follow up if any delayed reaction.    ________________________________    Assessment & Plan:    ==> Final Diagnosis:     # perennial coughing and rhinitis    In prick and intradermal tests no signs for specific immediate type allergic reaction    Patient will feed back if any delayed reaction  most likely non-allergic rhinitis with postnasal drip and consequent coughing  * chronic illness with exacerbation, progression, side effects from treatment      These conclusions are made at the best of one's knowledge and belief based on the provided evidence such as patient's history and allergy test results and they can change over time or can be incomplete because of missing information's.    ==> Treatment Plan:  >> patient will feed back if delayed reaction  >> take blood for total IgE and spec IgE dust mites  >> continue  Loratadine in the evening and add Nasonex nasal spray every evening for about 1 month  >> if no delayed reaction then get patient checked by ENT      Procedures Performed:  Allergy tests, including prick, intradermal and patch tests, drug allergy or provocation tests***    Staff: : provider    Follow-up in Derm-Allergy clinic ***  ___________________________      I spent a total of 20 minutes with Alexa Shaikh during today s  visit. This time was spent discussing all the individual test results, correlating them to the clinical relevance, counseling the patient and/or coordinating care and performing allergy tests or procedures.           Again, thank you for allowing me to participate in the care of your patient.        Sincerely,        Azam De La Torre MD

## 2021-09-27 NOTE — NURSING NOTE
Chief Complaint   Patient presents with     Allergy Testing Followup     Alexa is here today for some allergy prick testing     Angelica Lamar

## 2021-09-28 LAB
D FARINAE IGE QN: <0.1 KU(A)/L
D PTERONYSS IGE QN: <0.1 KU(A)/L
IGE SERPL-ACNC: 19 KU/L (ref 0–114)

## 2021-09-28 NOTE — TELEPHONE ENCOUNTER
FUTURE VISIT INFORMATION      FUTURE VISIT INFORMATION:    Date: 11/26/21    Time: 10 AM    Location: [facility]ENT  REFERRAL INFORMATION:    Referring provider: Dr. Azam De La Torre    Referring providers clinic: [facility] - Allergy    Reason for visit/diagnosis: Post Nasal Drip, chronic cough    RECORDS REQUESTED FROM:       Clinic name Comments Records Status Imaging Status   [facility] 9/27/21, 9/22/21 - ALLERGY OV with Dr. De La Torre  3/10/21 - PCC OV with Dr. Siva Larsen

## 2021-10-19 ENCOUNTER — TELEPHONE (OUTPATIENT)
Dept: INTERNAL MEDICINE | Facility: CLINIC | Age: 56
End: 2021-10-19

## 2021-10-19 DIAGNOSIS — R39.9 LOWER URINARY TRACT SYMPTOMS (LUTS): ICD-10-CM

## 2021-10-19 DIAGNOSIS — E03.9 HYPOTHYROIDISM, UNSPECIFIED TYPE: ICD-10-CM

## 2021-10-19 RX ORDER — LEVOTHYROXINE SODIUM 125 UG/1
125 TABLET ORAL DAILY
Qty: 90 TABLET | Refills: 3 | Status: CANCELLED | OUTPATIENT
Start: 2021-10-19

## 2021-10-19 RX ORDER — TAMSULOSIN HYDROCHLORIDE 0.4 MG/1
0.8 CAPSULE ORAL DAILY
Qty: 180 CAPSULE | Refills: 3 | Status: CANCELLED | OUTPATIENT
Start: 2021-10-19

## 2021-10-19 NOTE — TELEPHONE ENCOUNTER
[facility] Call Center    Phone Message    May a detailed message be left on voicemail: yes     Reason for Call: Medication Refill Request    Has the patient contacted the pharmacy for the refill? Yes   Name of medication being requested: levothyroxine (SYNTHROID/LEVOTHROID) 125 MCG tablet and tamsulosin (FLOMAX) 0.4 MG capsule  Provider who prescribed the medication: Dr Paniagua  Pharmacy:    [id number] [address] [facility]      Date medication is needed: when it is due         Action Taken: Message routed to:  [facility]    Travel Screening: Not Applicable

## 2021-11-16 ENCOUNTER — TELEPHONE (OUTPATIENT)
Dept: OTOLARYNGOLOGY | Facility: CLINIC | Age: 56
End: 2021-11-16
Payer: COMMERCIAL

## 2021-11-26 ENCOUNTER — PRE VISIT (OUTPATIENT)
Dept: OTOLARYNGOLOGY | Facility: CLINIC | Age: 56
End: 2021-11-26

## 2021-12-20 NOTE — TELEPHONE ENCOUNTER
Previous Medications: Nasonex (Bigliardi) 1 spray QD    Previous Imaging: none    Provider recommendations:     Additional info: # perennial coughing and rhinitis  ? In prick and intradermal tests no signs for specific immediate type allergic reaction  ? Patient will feed back if any delayed reaction  most likely non-allergic rhinitis with postnasal drip and consequent coughing  * chronic illness with exacerbation, progression, side effects from treatment  continue Loratadine in the evening and add Nasonex nasal spray every evening for about 1 month  >> if no delayed reaction then get patient checked by ENT    DANDY DAVIS LPN on 12/20/2021 at 11:45 AM

## 2021-12-20 NOTE — PATIENT INSTRUCTIONS
"1. You were seen in the clinic today by Dr. Delgado. If you have any questions or concerns after your appointment, please call the clinic at [phone]. Press \"1\" for scheduling, press \"3\" for nurse advice.    2.   The following has been recommended for you based upon your appointment today:   -Start sinus rinses daily at night before bed.   -Atrovent nasal spray after the sinus rinse.   -Mupirocin to each nostril twice daily with a q-tip. You will do this for one month and then you can transition over to Aquaphor.   -Please be sure to do the sinus rinses, followed by the Atrovent nasal spray and then the Mupirocin.    3.   Plan to return the clinic in       Kena Jamil MIKEY  [facility]  Department of Otolaryngology  [phone]    Cleaning of the Nasal or Sinus Cavity    Please follow all three steps.  Nasal irrigation (cleaning) should be done 2 times/day.    Preparation:  1.  Wash your hands  2.  We suggest that you buy the [employer] Sinus Rinse Kit  3.  Use distilled water or tap water that has been boiled and brought to room temperature.  4.  Fill the irrigation bottle with room temperature water (distilled or boiled tap water) and add mixture (pre made packet or homemade recipe).        If you wish to make a homemade recipe:        Mix 1/4 teaspoon salt with 1/4 teaspoon baking soda in each bottle.    Cleansing Irrigation/Sinus Rinse:    1.  Lean forward over a sink and insert the rinse bottle applicator into the right side of your nose.    2.  Tilt head down and to the left side.  With your mouth open (breathing through your mouth), gently direct the water around the inside of your nose until clear fluid starts to drain from the opposite nostril.  This is called flushing or irrigation.    3.  When you have used 1/4 to 1/2 or the solution, switch to the left nostril.    4.  To irrigate the left nostril, tilt your head down and to the right side.  With your mouth open (breathing through your mouth),  gently " direct the water around the inside of your nose until clear fluid starts to drain from the opposite nostril.     Cleaning the Equipment:  1.  Throw away any leftover solution  2.  Clean the rinse bottle and cap with clear water. Air dry.      Call the ENT Clinic if you have any questions or concerns at [phone]    [employer] sinus rinse kit, can be purchased at most retail pharmacies

## 2021-12-22 ENCOUNTER — OFFICE VISIT (OUTPATIENT)
Dept: OTOLARYNGOLOGY | Facility: CLINIC | Age: 56
End: 2021-12-22
Attending: DERMATOLOGY
Payer: COMMERCIAL

## 2021-12-22 VITALS
HEART RATE: 65 BPM | WEIGHT: 196 LBS | BODY MASS INDEX: 28.06 KG/M2 | DIASTOLIC BLOOD PRESSURE: 93 MMHG | OXYGEN SATURATION: 99 % | TEMPERATURE: 98.3 F | SYSTOLIC BLOOD PRESSURE: 139 MMHG | HEIGHT: 70 IN

## 2021-12-22 DIAGNOSIS — J30.9 ALLERGIC RHINITIS WITH POSTNASAL DRIP: ICD-10-CM

## 2021-12-22 DIAGNOSIS — R09.82 POSTNASAL DRIP: Primary | ICD-10-CM

## 2021-12-22 DIAGNOSIS — R09.82 ALLERGIC RHINITIS WITH POSTNASAL DRIP: ICD-10-CM

## 2021-12-22 DIAGNOSIS — R05.3 CHRONIC COUGH: ICD-10-CM

## 2021-12-22 DIAGNOSIS — J30.89 NON-SEASONAL ALLERGIC RHINITIS DUE TO OTHER ALLERGIC TRIGGER: ICD-10-CM

## 2021-12-22 PROCEDURE — 31231 NASAL ENDOSCOPY DX: CPT | Performed by: OTOLARYNGOLOGY

## 2021-12-22 PROCEDURE — 99203 OFFICE O/P NEW LOW 30 MIN: CPT | Mod: 25 | Performed by: OTOLARYNGOLOGY

## 2021-12-22 RX ORDER — MUPIROCIN 20 MG/G
OINTMENT TOPICAL
Qty: 30 G | Refills: 0 | Status: SHIPPED | OUTPATIENT
Start: 2021-12-22 | End: 2022-02-01

## 2021-12-22 RX ORDER — IPRATROPIUM BROMIDE 42 UG/1
2 SPRAY, METERED NASAL 4 TIMES DAILY
Qty: 15 ML | Refills: 3 | Status: SHIPPED | OUTPATIENT
Start: 2021-12-22 | End: 2023-02-22

## 2021-12-22 ASSESSMENT — MIFFLIN-ST. JEOR: SCORE: 1725.3

## 2021-12-22 ASSESSMENT — PAIN SCALES - GENERAL: PAINLEVEL: NO PAIN (0)

## 2021-12-22 NOTE — NURSING NOTE
"Chief Complaint   Patient presents with     Consult     New patient       Blood pressure (!) 139/93, pulse 65, temperature 98.3  F (36.8  C), temperature source Temporal, height 1.778 m (5' 10\"), weight 88.9 kg (196 lb), SpO2 99 %.    Maxime Allen, EMT  "

## 2021-12-22 NOTE — LETTER
12/22/2021       RE: Alexa Shaikh  [address]  [facility] [address]     Dear Colleague,    Thank you for referring your patient, Alexa Shaikh, to the [facility] [place] at [facility]. Please see a copy of my visit note below.      [facility]  New Patient Visit      Encounter date:   December 22, 2021    Referring Provider:   Azam De La Torre MD  [address] [place] [id number]    Chief Complaint: Postnasal drip, chronic cough    History of Present Illness:   Alexa Shaikh is a 56 year old male who presents for consultation regarding postnasal drip and chronic cough. The patient was seen by Dr. De La Torre on 09/27/2021 and complained of a chronic cough since July 2020 along with a stuffy runny nose in the morning. His prick and intradermal tests showed no signs of specific immediate or delayed type allergic reaction, so he was referred here for further evaluation.     Today, the patient states he started coughing 2 years ago and was put on asthma medications which helped for awhile. He reports allergy medications helped his cough and reports it has since resolved. The patient states he wakes up with mucus in the back of his throat in the morning. He reports he is able to cough up thick yellow discharge. He states he uses Nasonex before bed without relief. He reports he also uses Vaseline in his nose as well. The patient notes he gets nosebleeds in the winter time as well. He reports he has a history of acid reflux and snoring.     Sino-Nasal Outcome Test (SNOT - 22)  1. Need to Blow Nose: (P) Moderate  2. Nasal Blockage: (P) Mild or slight  3. Sneezing: (P) Very mild  4. Runny Nose: (P) Very mild  5. Cough: (P) None  6. Post-nasal discharge: (P) Mild or slight  7. Thick nasal discharge: (P) Mild or slight  8. Ear fullness: (P) None  9. Dizziness: (P) None  10. Ear Pain: (P) None  11. Facial pain/pressure: (P)  None  12. Decreased Sense of Smell/Taste: (P) None  13. Difficulty falling asleep: (P) None  14. Wake up at night: (P) Mild or slight  15. Lack of a good night's sleep: (P) Mild or slight  16. Wake up tired: (P) Very mild  17. Fatigue: (P) Very mild  18. Reduced Productivity: (P) Very mild  19. Reduced Concentration: (P) None  20. Frustrated/restless/irritable: (P) None  21. Sad: (P) None  22. Embarrassed: (P) None  Total Score: (P) 18    [place] Operative History:  No history of sinus surgery.     Review of systems: A 14-point review of systems has been conducted and was negative for any notable symptoms, except as dictated in the history of present illness.     Medical History:  Past Medical History:   Diagnosis Date     Graves' disease     s/p I-131     hemorrhoids      Hyperlipidemia     early metabolic syndrome     Hypothyroidism      IGT (impaired glucose tolerance)      Lower urinary tract symptoms (LUTS) 2018     Tubular adenoma 2018        Surgical History:   Past Surgical History:   Procedure Laterality Date     COLONOSCOPY N/A 2021    Procedure: COLONOSCOPY, WITH POLYPECTOMY;  Surgeon: Kevin Esquivel DO;  Location: [facility] OR     elective circumcision          Family History:  Family History   Problem Relation Age of Onset     Thyroid Disease Sister         Graves d     Cancer Mother         throat cancer      Thyroid Disease Mother         Graves d     Diabetes Mother      Hypertension Father      Diabetes Brother         Social History:   Social History     Socioeconomic History     Marital status:      Spouse name: Not on file     Number of children: Not on file     Years of education: Not on file     Highest education level: Not on file   Occupational History     Occupation: research opthalmology     Employer: [employer]   Tobacco Use     Smoking status: Never Smoker     Smokeless tobacco: Never Used   Substance and Sexual Activity     Alcohol use: No     Alcohol/week: 0.0 standard  "drinks     Drug use: No     Sexual activity: Not on file   Other Topics Concern     Parent/sibling w/ CABG, MI or angioplasty before 65F 55M? Not Asked      Service Not Asked     Blood Transfusions Not Asked     Caffeine Concern Not Asked     Occupational Exposure Not Asked     Hobby Hazards Not Asked     Sleep Concern Not Asked     Stress Concern Not Asked     Weight Concern Not Asked     Special Diet Yes     Comment: vegetarian     Back Care Not Asked     Exercise No     Bike Helmet Not Asked     Seat Belt Not Asked     Self-Exams Not Asked   Social History Narrative     Not on file     Social Determinants of Health     Financial Resource Strain: Not on file   Food Insecurity: Not on file   Transportation Needs: Not on file   Physical Activity: Not on file   Stress: Not on file   Social Connections: Not on file   Intimate Partner Violence: Not on file   Housing Stability: Not on file        Physical Exam:  Vital signs: BP (!) 139/93   Pulse 65   Temp 98.3  F (36.8  C) (Temporal)   Ht 1.778 m (5' 10\")   Wt 88.9 kg (196 lb)   SpO2 99%   BMI 28.12 kg/m     General Appearance: No acute distress, appropriate demeanor, conversant  Eyes: moist conjunctivae; EOMI; pupils symmetric; visual acuity grossly intact; no proptosis  Head: normocephalic; overall symmetric appearance without deformity  Face: overall symmetric without deformity; HB I/VI  Ears: Normal appearance of external ear; external meatus normal in appearance; TMs intact without perforation or effusion bilaterally;   Nose: No external deformity; inferior turbinates without significant hypertrophy  Oral Cavity/oropharynx: Normal appearance of mucosa; tongue midline; no mass or lesions; oropharynx without obvious mucosal abnormality  Neck: no palpable lymphadenopathy; thyroid without palpable nodules  Lungs: symmetric chest rise; no wheezing  CV: Good distal perfusion; normal hear rate  Extremities: No deformity  Neurologic Exam: Cranial nerves " II-XII are grossly intact; no focal deficit      Procedure Note  Procedure performed: Rigid nasal endoscopy  Indication: To evaluate for sinonasal pathology not visualized on routine anterior rhinoscopy  Anesthesia: 4% topical lidocaine with 0.05% oxymetazoline  Description of procedure: A 30 degree, 3 mm rigid endoscope was inserted into bilateral nasal cavities and the nasal valves, nasal cavity, middle meatus, sphenoethmoid recess, and nasopharynx were thoroughly evaluated for evidence of obstruction, edema, purulence, polyps and/or mass/lesion.     [place]-Carl[place] Endoscopic Scoring System  Endoscopic observation Right Left   Polyps in middle meatus (0 = absent, 1 = restricted to middle meatus, 2 = Beyond middle meatus) 0 0   Discharge (0 = absent, 1 = thin and clear, 2 = thick, purulent) 0 0   Edema (0 = absent, 1 = mild-moderate, 2 = moderate-severe) 0 0   Crusting (0 = absent, 1 = mild-moderate, 2 = moderate-severe) 0 1   Scarring (0= absent, 1 = mild-moderate, 2 = moderate-severe) 0 0   Total 0 1     Findings  RT: MM and SER are clear. Nasopharynx is clear. Cobblestoning of the posterior oropharyngeal wall.   LT: Crusting and irritation of the anterior nasal cavity. MM and SER are clear. Nasopharynx is clear.     The patient tolerated the procedure well without complication.     Laboratory Review:  n/a    Imaging Review:  n/a    Pathology Review:  n/a    Assessment/Medical Decision Making:  Postnasal drip  Epistaxis  Nasal crusting    Plan:  I do not see any evidence of sinusitis on exam today. We discussed using mupirocin ointment BID for one month and Vaseline or Aquaphor after this. I also recommended Atrovent and a saline rinse at night before bed to help with chronic rhinitis and post-nasal drip. Alexa may follow up with me as needed.     Tim Delgado MD    [facility]  Rhinology  Endoscopic Skull Base Surgery  [facility]  Department of Otolaryngology -  Head & Neck Surgery    Scribe Disclosure:  I, Esther Quintero, am serving as a scribe to document services personally performed by Tim Delgado MD at this visit, based upon the provider's statements to me. All documentation has been reviewed by the aforementioned provider prior to being entered into the official medical record.       Again, thank you for allowing me to participate in the care of your patient.      Sincerely,    Tim Delgado MD

## 2021-12-22 NOTE — PROGRESS NOTES
[facility]  New Patient Visit      Encounter date:   December 22, 2021    Referring Provider:   Azam De La Torre MD  [address] [place] [address]    Chief Complaint: Postnasal drip, chronic cough    History of Present Illness:   Alexa Shaikh is a 56 year old male who presents for consultation regarding postnasal drip and chronic cough. The patient was seen by Dr. De La Torre on 09/27/2021 and complained of a chronic cough since July 2020 along with a stuffy runny nose in the morning. His prick and intradermal tests showed no signs of specific immediate or delayed type allergic reaction, so he was referred here for further evaluation.     Today, the patient states he started coughing 2 years ago and was put on asthma medications which helped for awhile. He reports allergy medications helped his cough and reports it has since resolved. The patient states he wakes up with mucus in the back of his throat in the morning. He reports he is able to cough up thick yellow discharge. He states he uses Nasonex before bed without relief. He reports he also uses Vaseline in his nose as well. The patient notes he gets nosebleeds in the winter time as well. He reports he has a history of acid reflux and snoring.     Sino-Nasal Outcome Test (SNOT - 22)  1. Need to Blow Nose: (P) Moderate  2. Nasal Blockage: (P) Mild or slight  3. Sneezing: (P) Very mild  4. Runny Nose: (P) Very mild  5. Cough: (P) None  6. Post-nasal discharge: (P) Mild or slight  7. Thick nasal discharge: (P) Mild or slight  8. Ear fullness: (P) None  9. Dizziness: (P) None  10. Ear Pain: (P) None  11. Facial pain/pressure: (P) None  12. Decreased Sense of Smell/Taste: (P) None  13. Difficulty falling asleep: (P) None  14. Wake up at night: (P) Mild or slight  15. Lack of a good night's sleep: (P) Mild or slight  16. Wake up tired: (P) Very mild  17. Fatigue: (P) Very mild  18. Reduced Productivity: (P) Very mild  19. Reduced Concentration:  (P) None  20. Frustrated/restless/irritable: (P) None  21. Sad: (P) None  22. Embarrassed: (P) None  Total Score: (P) 18    [place] Operative History:  No history of sinus surgery.     Review of systems: A 14-point review of systems has been conducted and was negative for any notable symptoms, except as dictated in the history of present illness.     Medical History:  Past Medical History:   Diagnosis Date     Graves' disease     s/p I-131     hemorrhoids      Hyperlipidemia     early metabolic syndrome     Hypothyroidism      IGT (impaired glucose tolerance)      Lower urinary tract symptoms (LUTS) 2018     Tubular adenoma 2018        Surgical History:   Past Surgical History:   Procedure Laterality Date     COLONOSCOPY N/A 2021    Procedure: COLONOSCOPY, WITH POLYPECTOMY;  Surgeon: Kevin Esquivel DO;  Location: [facility] OR     elective circumcision          Family History:  Family History   Problem Relation Age of Onset     Thyroid Disease Sister         Graves d     Cancer Mother         throat cancer      Thyroid Disease Mother         Graves d     Diabetes Mother      Hypertension Father      Diabetes Brother         Social History:   Social History     Socioeconomic History     Marital status:      Spouse name: Not on file     Number of children: Not on file     Years of education: Not on file     Highest education level: Not on file   Occupational History     Occupation: research opthalmology     Employer: [employer]   Tobacco Use     Smoking status: Never Smoker     Smokeless tobacco: Never Used   Substance and Sexual Activity     Alcohol use: No     Alcohol/week: 0.0 standard drinks     Drug use: No     Sexual activity: Not on file   Other Topics Concern     Parent/sibling w/ CABG, MI or angioplasty before 65F 55M? Not Asked      Service Not Asked     Blood Transfusions Not Asked     Caffeine Concern Not Asked     Occupational Exposure Not Asked     Hobby Hazards Not Asked      "Sleep Concern Not Asked     Stress Concern Not Asked     Weight Concern Not Asked     Special Diet Yes     Comment: vegetarian     Back Care Not Asked     Exercise No     Bike Helmet Not Asked     Seat Belt Not Asked     Self-Exams Not Asked   Social History Narrative     Not on file     Social Determinants of Health     Financial Resource Strain: Not on file   Food Insecurity: Not on file   Transportation Needs: Not on file   Physical Activity: Not on file   Stress: Not on file   Social Connections: Not on file   Intimate Partner Violence: Not on file   Housing Stability: Not on file        Physical Exam:  Vital signs: BP (!) 139/93   Pulse 65   Temp 98.3  F (36.8  C) (Temporal)   Ht 1.778 m (5' 10\")   Wt 88.9 kg (196 lb)   SpO2 99%   BMI 28.12 kg/m     General Appearance: No acute distress, appropriate demeanor, conversant  Eyes: moist conjunctivae; EOMI; pupils symmetric; visual acuity grossly intact; no proptosis  Head: normocephalic; overall symmetric appearance without deformity  Face: overall symmetric without deformity; HB I/VI  Ears: Normal appearance of external ear; external meatus normal in appearance; TMs intact without perforation or effusion bilaterally;   Nose: No external deformity; inferior turbinates without significant hypertrophy  Oral Cavity/oropharynx: Normal appearance of mucosa; tongue midline; no mass or lesions; oropharynx without obvious mucosal abnormality  Neck: no palpable lymphadenopathy; thyroid without palpable nodules  Lungs: symmetric chest rise; no wheezing  CV: Good distal perfusion; normal hear rate  Extremities: No deformity  Neurologic Exam: Cranial nerves II-XII are grossly intact; no focal deficit      Procedure Note  Procedure performed: Rigid nasal endoscopy  Indication: To evaluate for sinonasal pathology not visualized on routine anterior rhinoscopy  Anesthesia: 4% topical lidocaine with 0.05% oxymetazoline  Description of procedure: A 30 degree, 3 mm rigid " endoscope was inserted into bilateral nasal cavities and the nasal valves, nasal cavity, middle meatus, sphenoethmoid recess, and nasopharynx were thoroughly evaluated for evidence of obstruction, edema, purulence, polyps and/or mass/lesion.     [place]-Carlitso Endoscopic Scoring System  Endoscopic observation Right Left   Polyps in middle meatus (0 = absent, 1 = restricted to middle meatus, 2 = Beyond middle meatus) 0 0   Discharge (0 = absent, 1 = thin and clear, 2 = thick, purulent) 0 0   Edema (0 = absent, 1 = mild-moderate, 2 = moderate-severe) 0 0   Crusting (0 = absent, 1 = mild-moderate, 2 = moderate-severe) 0 1   Scarring (0= absent, 1 = mild-moderate, 2 = moderate-severe) 0 0   Total 0 1     Findings  RT: MM and SER are clear. Nasopharynx is clear. Cobblestoning of the posterior oropharyngeal wall.   LT: Crusting and irritation of the anterior nasal cavity. MM and SER are clear. Nasopharynx is clear.     The patient tolerated the procedure well without complication.     Laboratory Review:  n/a    Imaging Review:  n/a    Pathology Review:  n/a    Assessment/Medical Decision Making:  Postnasal drip  Epistaxis  Nasal crusting    Plan:  I do not see any evidence of sinusitis on exam today. We discussed using mupirocin ointment BID for one month and Vaseline or Aquaphor after this. I also recommended Atrovent and a saline rinse at night before bed to help with chronic rhinitis and post-nasal drip. Alexa may follow up with me as needed.     Tim Delgado MD    [facility]  Rhinology  Endoscopic Skull Base Surgery  [facility]  Department of Otolaryngology - Head & Neck Surgery    Scribe Disclosure:  RAUDEL, Esther Quintero, am serving as a scribe to document services personally performed by Tim Delgado MD at this visit, based upon the provider's statements to me. All documentation has been reviewed by the aforementioned provider prior to being entered into the  official medical record.

## 2022-01-31 ASSESSMENT — ENCOUNTER SYMPTOMS
TASTE DISTURBANCE: 0
TROUBLE SWALLOWING: 0
SINUS CONGESTION: 1
SMELL DISTURBANCE: 0
SORE THROAT: 0
HEMATURIA: 0
DYSURIA: 0
NECK MASS: 0
DIFFICULTY URINATING: 1
SINUS PAIN: 0
FLANK PAIN: 0
HOARSE VOICE: 0

## 2022-02-01 ENCOUNTER — OFFICE VISIT (OUTPATIENT)
Dept: INTERNAL MEDICINE | Facility: CLINIC | Age: 57
End: 2022-02-01
Payer: COMMERCIAL

## 2022-02-01 ENCOUNTER — LAB (OUTPATIENT)
Dept: LAB | Facility: CLINIC | Age: 57
End: 2022-02-01
Payer: COMMERCIAL

## 2022-02-01 VITALS
RESPIRATION RATE: 16 BRPM | WEIGHT: 191.8 LBS | HEIGHT: 70 IN | HEART RATE: 86 BPM | SYSTOLIC BLOOD PRESSURE: 146 MMHG | BODY MASS INDEX: 27.46 KG/M2 | DIASTOLIC BLOOD PRESSURE: 87 MMHG | OXYGEN SATURATION: 97 %

## 2022-02-01 DIAGNOSIS — R73.02 IGT (IMPAIRED GLUCOSE TOLERANCE): ICD-10-CM

## 2022-02-01 DIAGNOSIS — R39.9 LOWER URINARY TRACT SYMPTOMS (LUTS): ICD-10-CM

## 2022-02-01 DIAGNOSIS — E78.5 HYPERLIPIDEMIA, UNSPECIFIED HYPERLIPIDEMIA TYPE: ICD-10-CM

## 2022-02-01 DIAGNOSIS — E03.9 HYPOTHYROIDISM, UNSPECIFIED TYPE: ICD-10-CM

## 2022-02-01 DIAGNOSIS — E03.9 HYPOTHYROIDISM, UNSPECIFIED TYPE: Primary | ICD-10-CM

## 2022-02-01 LAB
ALBUMIN UR-MCNC: NEGATIVE MG/DL
ANION GAP SERPL CALCULATED.3IONS-SCNC: 2 MMOL/L (ref 3–14)
APPEARANCE UR: CLEAR
BASOPHILS # BLD AUTO: 0.1 10E3/UL (ref 0–0.2)
BASOPHILS NFR BLD AUTO: 1 %
BILIRUB UR QL STRIP: NEGATIVE
BUN SERPL-MCNC: 14 MG/DL (ref 7–30)
CALCIUM SERPL-MCNC: 9.4 MG/DL (ref 8.5–10.1)
CHLORIDE BLD-SCNC: 105 MMOL/L (ref 94–109)
CHOLEST SERPL-MCNC: 173 MG/DL
CO2 SERPL-SCNC: 30 MMOL/L (ref 20–32)
COLOR UR AUTO: YELLOW
CREAT SERPL-MCNC: 0.81 MG/DL (ref 0.66–1.25)
EOSINOPHIL # BLD AUTO: 0.1 10E3/UL (ref 0–0.7)
EOSINOPHIL NFR BLD AUTO: 2 %
ERYTHROCYTE [DISTWIDTH] IN BLOOD BY AUTOMATED COUNT: 12.5 % (ref 10–15)
FASTING STATUS PATIENT QL REPORTED: YES
GFR SERPL CREATININE-BSD FRML MDRD: >90 ML/MIN/1.73M2
GLUCOSE BLD-MCNC: 132 MG/DL (ref 70–99)
GLUCOSE UR STRIP-MCNC: NEGATIVE MG/DL
HBA1C MFR BLD: 6.4 % (ref 0–5.6)
HCT VFR BLD AUTO: 46.8 % (ref 40–53)
HDLC SERPL-MCNC: 46 MG/DL
HGB BLD-MCNC: 15.4 G/DL (ref 13.3–17.7)
HGB UR QL STRIP: NEGATIVE
IMM GRANULOCYTES # BLD: 0 10E3/UL
IMM GRANULOCYTES NFR BLD: 0 %
KETONES UR STRIP-MCNC: NEGATIVE MG/DL
LDLC SERPL CALC-MCNC: 104 MG/DL
LEUKOCYTE ESTERASE UR QL STRIP: NEGATIVE
LYMPHOCYTES # BLD AUTO: 1 10E3/UL (ref 0.8–5.3)
LYMPHOCYTES NFR BLD AUTO: 22 %
MCH RBC QN AUTO: 30.8 PG (ref 26.5–33)
MCHC RBC AUTO-ENTMCNC: 32.9 G/DL (ref 31.5–36.5)
MCV RBC AUTO: 94 FL (ref 78–100)
MONOCYTES # BLD AUTO: 0.3 10E3/UL (ref 0–1.3)
MONOCYTES NFR BLD AUTO: 6 %
MUCOUS THREADS #/AREA URNS LPF: PRESENT /LPF
NEUTROPHILS # BLD AUTO: 3.1 10E3/UL (ref 1.6–8.3)
NEUTROPHILS NFR BLD AUTO: 69 %
NITRATE UR QL: NEGATIVE
NONHDLC SERPL-MCNC: 127 MG/DL
NRBC # BLD AUTO: 0 10E3/UL
NRBC BLD AUTO-RTO: 0 /100
PH UR STRIP: 7 [PH] (ref 5–7)
PLATELET # BLD AUTO: 158 10E3/UL (ref 150–450)
POTASSIUM BLD-SCNC: 3.9 MMOL/L (ref 3.4–5.3)
RBC # BLD AUTO: 5 10E6/UL (ref 4.4–5.9)
RBC URINE: <1 /HPF
SODIUM SERPL-SCNC: 137 MMOL/L (ref 133–144)
SP GR UR STRIP: 1.01 (ref 1–1.03)
SQUAMOUS EPITHELIAL: <1 /HPF
TRIGL SERPL-MCNC: 116 MG/DL
TSH SERPL DL<=0.005 MIU/L-ACNC: 1.66 MU/L (ref 0.4–4)
UROBILINOGEN UR STRIP-MCNC: NORMAL MG/DL
WBC # BLD AUTO: 4.5 10E3/UL (ref 4–11)
WBC URINE: <1 /HPF

## 2022-02-01 PROCEDURE — 81001 URINALYSIS AUTO W/SCOPE: CPT | Performed by: PATHOLOGY

## 2022-02-01 PROCEDURE — 99396 PREV VISIT EST AGE 40-64: CPT | Performed by: INTERNAL MEDICINE

## 2022-02-01 PROCEDURE — 85025 COMPLETE CBC W/AUTO DIFF WBC: CPT | Performed by: PATHOLOGY

## 2022-02-01 PROCEDURE — 80061 LIPID PANEL: CPT | Mod: 90 | Performed by: PATHOLOGY

## 2022-02-01 PROCEDURE — 84443 ASSAY THYROID STIM HORMONE: CPT | Mod: 90 | Performed by: PATHOLOGY

## 2022-02-01 PROCEDURE — 36415 COLL VENOUS BLD VENIPUNCTURE: CPT | Performed by: PATHOLOGY

## 2022-02-01 PROCEDURE — 99000 SPECIMEN HANDLING OFFICE-LAB: CPT | Performed by: PATHOLOGY

## 2022-02-01 PROCEDURE — 83036 HEMOGLOBIN GLYCOSYLATED A1C: CPT | Performed by: PATHOLOGY

## 2022-02-01 PROCEDURE — 80048 BASIC METABOLIC PNL TOTAL CA: CPT | Mod: 90 | Performed by: PATHOLOGY

## 2022-02-01 RX ORDER — LEVOTHYROXINE SODIUM 125 UG/1
125 TABLET ORAL DAILY
Qty: 90 TABLET | Refills: 3 | Status: SHIPPED | OUTPATIENT
Start: 2022-02-01 | End: 2023-02-22

## 2022-02-01 RX ORDER — TAMSULOSIN HYDROCHLORIDE 0.4 MG/1
0.8 CAPSULE ORAL DAILY
Qty: 180 CAPSULE | Refills: 3 | Status: SHIPPED | OUTPATIENT
Start: 2022-02-01 | End: 2023-02-22

## 2022-02-01 ASSESSMENT — MIFFLIN-ST. JEOR: SCORE: 1702.28

## 2022-02-01 NOTE — NURSING NOTE
"Alexa Shaikh is a 56 year old male patient that presents today in clinic for the following:    Chief Complaint   Patient presents with     Physical     Annual Visit     Lab Only     The patient's allergies and medications were reviewed as noted. A set of vitals were recorded as noted without incident: BP (!) 146/87 (BP Location: Right arm, Patient Position: Sitting, Cuff Size: Adult Regular)   Pulse 86   Resp 16   Ht 1.772 m (5' 9.75\")   Wt 87 kg (191 lb 12.8 oz)   SpO2 97%   BMI 27.72 kg/m  . The patient was asked if they had any of the following symptoms in the last forty-eight hours: (1) fever or chills, (2) cough, (3) shortness of breath or difficulty breathing, (4) fatigue, (5) muscle or body aches, (6) headache, (7) new loss of taste or smell, (8) sore throat, (9) congestion or runny nose, (10) nausea or vomiting, and (11) diarrhea. Alexa Shaikh denies having any of the following symptoms in the last forty-eight hours: (1) fever or chills, (2) cough, (3) shortness of breath or difficulty breathing, (4) fatigue, (5) muscle or body aches, (6) headache, (7) new loss of taste or smell, (8) sore throat, (9) congestion or runny nose, (10) nausea or vomiting, and (11) diarrhea. The patient does not have any other questions for the provider.    Chris Lee, EMT at 10:53 AM on 2/1/2022  "

## 2022-02-01 NOTE — PROGRESS NOTES
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  Is continued to have ongoing lower urinary tract symptoms.  He is on the tamsulosin now 0.8 mg at bedtime.  He does indicate that it bothers him to get up at night but he is always able to get back to sleep right away.  He also has noted retrograde ejaculation and some nasal stuffiness which we discussed is likely a side effect of the tamsulosin.  We discussed additional medications including finasteride and Cialis at this point he does not feel it significant enough to add another medication.  He has been exercising on an exercise bike 4 days a week for about 20 to 30 minutes and we discussed increasing this.  He does monitor his blood pressure at home and this is consistently running in the 120s over 70s by his report.  He has had no problems on his present medications he did have an allergy evaluation which was negative and an ENT evaluation which switched his inhaled steroid that has not made a difference in the nasal stuffiness.  Past Medical History:   Diagnosis Date     Graves' disease     s/p I-131     hemorrhoids      Hyperlipidemia     early metabolic syndrome     Hypothyroidism      IGT (impaired glucose tolerance)      Lower urinary tract symptoms (LUTS) 2018     Tubular adenoma 2018     Past Surgical History:   Procedure Laterality Date     COLONOSCOPY N/A 2021    Procedure: COLONOSCOPY, WITH POLYPECTOMY;  Surgeon: Kevin Esquivel DO;  Location: [facility] OR     elective circumcision       Family History   Problem Relation Age of Onset     Thyroid Disease Sister         Graves d     Cancer Mother         throat cancer      Thyroid Disease Mother         Graves d     Diabetes Mother      Hypertension Father      Diabetes Brother      Answers for HPI/ROS submitted by the patient on 2022  General Symptoms: No  Skin Symptoms: No  HENT Symptoms: Yes  EYE SYMPTOMS: No  HEART SYMPTOMS: No  LUNG SYMPTOMS: No  INTESTINAL  "SYMPTOMS: No  URINARY SYMPTOMS: Yes  REPRODUCTIVE SYMPTOMS: No  SKELETAL SYMPTOMS: No  BLOOD SYMPTOMS: No  NERVOUS SYSTEM SYMPTOMS: No  MENTAL HEALTH SYMPTOMS: No  Ear pain: No  Ear discharge: No  Hearing loss: No  Tinnitus: No  Nosebleeds: No  Congestion: Yes  Sinus pain: No  Trouble swallowing: No   Voice hoarseness: No  Mouth sores: No  Sore throat: No  Tooth pain: No  Gum tenderness: No  Bleeding gums: No  Change in taste: No  Change in sense of smell: No  Dry mouth: No  Hearing aid used: No  Neck lump: No  Trouble holding urine or incontinence: Yes  Pain or burning: No  Trouble starting or stopping: Yes  Increased frequency of urination: Yes  Blood in urine: No  Decreased frequency of urination: No  Frequent nighttime urination: Yes  Flank pain: No  Difficulty emptying bladder: Yes        Exam:  BP (!) 146/87 (BP Location: Right arm, Patient Position: Sitting, Cuff Size: Adult Regular)   Pulse 86   Resp 16   Ht 1.772 m (5' 9.75\")   Wt 87 kg (191 lb 12.8 oz)   SpO2 97%   BMI 27.72 kg/m    191 lbs 12.8 oz  Physical Exam   The patient is alert, oriented with a clear sensorium.   Skin shows no lesions or rashes and good turgor.   Head is normocephalic and atraumatic.   Eyes show PERRLA with benign optic fundi.   Ears show normal TMs bilaterally.   Mouth shows clear oral mucosa.   Neck shows no nodes, thyromegaly or bruits.   Back is non tender.  Lungs are clear to percussion and auscultation.   Heart shows normal S1 and S2 without murmur or gallop.   Abdomen is soft and nontender without masses or organomegaly.   Genitalia show normal testes. No evidence of inguinal hernia.  Rectal shows large smooth prostate without nodules or masses.  Extremities show no edema and no evidence of active synovitis.   Neurologic examination shows cranial nerves II-XII intact. Motor shows 5/5 strength. Reflexes are symmetric. Cerebellar testing shows normal tandem gait.  Romberg negative.  AUA=8 significantly improved  Labs " reviewed:  Results for orders placed or performed in visit on 02/01/22   Basic metabolic panel     Status: Abnormal   Result Value Ref Range    Sodium 137 133 - 144 mmol/L    Potassium 3.9 3.4 - 5.3 mmol/L    Chloride 105 94 - 109 mmol/L    Carbon Dioxide (CO2) 30 20 - 32 mmol/L    Anion Gap 2 (L) 3 - 14 mmol/L    Urea Nitrogen 14 7 - 30 mg/dL    Creatinine 0.81 0.66 - 1.25 mg/dL    Calcium 9.4 8.5 - 10.1 mg/dL    Glucose 132 (H) 70 - 99 mg/dL    GFR Estimate >90 >60 mL/min/1.73m2   Lipid Profile     Status: Abnormal   Result Value Ref Range    Cholesterol 173 <200 mg/dL    Triglycerides 116 <150 mg/dL    Direct Measure HDL 46 >=40 mg/dL    LDL Cholesterol Calculated 104 (H) <=100 mg/dL    Non HDL Cholesterol 127 <130 mg/dL    Patient Fasting > 8hrs? Yes     Narrative    Cholesterol  Desirable:  <200 mg/dL    Triglycerides  Normal:  Less than 150 mg/dL  Borderline High:  150-199 mg/dL  High:  200-499 mg/dL  Very High:  Greater than or equal to 500 mg/dL    Direct Measure HDL  Female:  Greater than or equal to 50 mg/dL   Male:  Greater than or equal to 40 mg/dL    LDL Cholesterol  Desirable:  <100mg/dL  Above Desirable:  100-129 mg/dL   Borderline High:  130-159 mg/dL   High:  160-189 mg/dL   Very High:  >= 190 mg/dL    Non HDL Cholesterol  Desirable:  130 mg/dL  Above Desirable:  130-159 mg/dL  Borderline High:  160-189 mg/dL  High:  190-219 mg/dL  Very High:  Greater than or equal to 220 mg/dL   TSH     Status: Normal   Result Value Ref Range    TSH 1.66 0.40 - 4.00 mU/L   UA with Micro reflex to Culture     Status: Abnormal    Specimen: Urine, Midstream   Result Value Ref Range    Color Urine Yellow Colorless, Straw, Light Yellow, Yellow    Appearance Urine Clear Clear    Glucose Urine Negative Negative mg/dL    Bilirubin Urine Negative Negative    Ketones Urine Negative Negative mg/dL    Specific Gravity Urine 1.012 1.003 - 1.035    Blood Urine Negative Negative    pH Urine 7.0 5.0 - 7.0    Protein Albumin Urine  Negative Negative mg/dL    Urobilinogen Urine Normal Normal, 2.0 mg/dL    Nitrite Urine Negative Negative    Leukocyte Esterase Urine Negative Negative    Mucus Urine Present (A) None Seen /LPF    RBC Urine <1 <=2 /HPF    WBC Urine <1 <=5 /HPF    Squamous Epithelials Urine <1 <=1 /HPF    Narrative    Urine Culture not indicated   CBC with platelets and differential     Status: None   Result Value Ref Range    WBC Count 4.5 4.0 - 11.0 10e3/uL    RBC Count 5.00 4.40 - 5.90 10e6/uL    Hemoglobin 15.4 13.3 - 17.7 g/dL    Hematocrit 46.8 40.0 - 53.0 %    MCV 94 78 - 100 fL    MCH 30.8 26.5 - 33.0 pg    MCHC 32.9 31.5 - 36.5 g/dL    RDW 12.5 10.0 - 15.0 %    Platelet Count 158 150 - 450 10e3/uL    % Neutrophils 69 %    % Lymphocytes 22 %    % Monocytes 6 %    % Eosinophils 2 %    % Basophils 1 %    % Immature Granulocytes 0 %    NRBCs per 100 WBC 0 <1 /100    Absolute Neutrophils 3.1 1.6 - 8.3 10e3/uL    Absolute Lymphocytes 1.0 0.8 - 5.3 10e3/uL    Absolute Monocytes 0.3 0.0 - 1.3 10e3/uL    Absolute Eosinophils 0.1 0.0 - 0.7 10e3/uL    Absolute Basophils 0.1 0.0 - 0.2 10e3/uL    Absolute Immature Granulocytes 0.0 <=0.4 10e3/uL    Absolute NRBCs 0.0 10e3/uL   CBC with Platelets & Differential     Status: None    Narrative    The following orders were created for panel order CBC with Platelets & Differential.  Procedure                               Abnormality         Status                     ---------                               -----------         ------                     CBC with platelets and d...[[id number]]                      Final result                 Please view results for these tests on the individual orders.       ASSESSMENT  1 hypothyroidism on 125 mcg daily  2 hyperlipidemia   3 BPH with LUTS on tamsulosin  4 tubular adenoma due for colonoscopy 2028  5 CMC DJD both thumbs  6.Hypertension white coat  7 history of mesenteric panniculitis   8 IGT/ prediabetes  9 Nasal congestion secondary  tamsulosin    Plan  We are going to reassess his labs today.  We discussed nonpharmacologic blood pressure control measures and continue blood pressure monitoring at home.  He will call if he wants to try the Cialis daily.  We discussed PSA testing risks and benefits and he declined.    This note was completed using Dragon voice recognition software.      Ritesh Paniagua MD  General Internal Medicine  [facility]  [phone]

## 2022-09-09 ENCOUNTER — OFFICE VISIT (OUTPATIENT)
Dept: OPHTHALMOLOGY | Facility: CLINIC | Age: 57
End: 2022-09-09
Attending: OPHTHALMOLOGY
Payer: COMMERCIAL

## 2022-09-09 DIAGNOSIS — H04.123 DRY EYE SYNDROME OF BOTH EYES: Primary | ICD-10-CM

## 2022-09-09 DIAGNOSIS — H52.13 MYOPIA OF BOTH EYES: ICD-10-CM

## 2022-09-09 DIAGNOSIS — H11.31 SUBCONJUNCTIVAL HEMORRHAGE OF RIGHT EYE: ICD-10-CM

## 2022-09-09 PROCEDURE — 99213 OFFICE O/P EST LOW 20 MIN: CPT | Performed by: OPHTHALMOLOGY

## 2022-09-09 PROCEDURE — G0463 HOSPITAL OUTPT CLINIC VISIT: HCPCS

## 2022-09-09 ASSESSMENT — TONOMETRY
OS_IOP_MMHG: 12
IOP_METHOD: ICARE
OD_IOP_MMHG: 14

## 2022-09-09 ASSESSMENT — VISUAL ACUITY
OD_CC+: +1
OS_PH_CC: 20/25
OS_CC+: +2
METHOD_MR: PT DECLINES REFRACTION
OD_CC: 20/25
CORRECTION_TYPE: GLASSES
METHOD: SNELLEN - LINEAR
OS_CC: 20/30

## 2022-09-09 ASSESSMENT — SLIT LAMP EXAM - LIDS
COMMENTS: NORMAL
COMMENTS: NORMAL

## 2022-09-09 ASSESSMENT — REFRACTION_WEARINGRX
OS_ADD: +3.00
OS_CYLINDER: +0.50
OS_SPHERE: -9.75
OS_AXIS: 175
SPECS_TYPE: PAL
OD_AXIS: 175
OD_ADD: +3.00
OD_CYLINDER: +0.50
OD_SPHERE: -10.00

## 2022-09-09 ASSESSMENT — EXTERNAL EXAM - RIGHT EYE: OD_EXAM: NORMAL

## 2022-09-09 ASSESSMENT — CONF VISUAL FIELD
OS_NORMAL: 1
OD_NORMAL: 1
METHOD: COUNTING FINGERS

## 2022-09-09 ASSESSMENT — EXTERNAL EXAM - LEFT EYE: OS_EXAM: NORMAL

## 2022-09-09 NOTE — NURSING NOTE
Chief Complaints and History of Present Illnesses   Patient presents with     Consult For     Sub conjunctival hemorrhage, right eye     Chief Complaint(s) and History of Present Illness(es)     Consult For     Laterality: right eye    Onset: 2 months ago    Course: stable    Associated symptoms: dryness, eye pain and redness.  Negative for photophobia    Treatments tried: artificial tears    Pain scale: 0/10 (None currently)    Comments: Sub conjunctival hemorrhage, right eye              Comments     He tells em that he has been having a conjunctival hemorrhage every two weeks in his right eye.  The issue is medial and inferior.  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  Sometimes he gets sharp pain on the surface or a foreign body sensation.  Often this occurs at the start of the bleeding.    He uses artificial tears twice a day.    Liset Gerber, MARYURI 8:07 AM  September 9, 2022

## 2022-09-09 NOTE — PATIENT INSTRUCTIONS
Please perform the following:     Eye drops: (please wait approximately 5 minutes between drops if they contain medication)  -Preservative-free artificial tears 1 drop to both eye(s) at least 4 times a day     -examples include: Refresh Plus, Systane preservative-free, Thera Tears, as well as those made by your favorite stores (just please make sure they say preservative-free on them and are in separate vials/dropperettes)    -please be aware that many of these can be re-capped, so as to reduce waste of these please check your specific tears type      Ointment: (be careful of blurry vision and reduce your chances of falling after placing these, 1/4 inch is approximately the size of a grain of rice)  -Artificial tear ointment 1/4 inch to Both eye(s) at bedtime     -examples include: Refresh PM, Lacrilube, etc.    -you may also use thicker drops such as gel drops that last longer but with caution as they blur your vision and should never be placed immediately before activities such as driving; examples include: Systane gel drops, Genteal gel drops, Refresh Liquigel or Celluvisc    Other:   -Take frequent breaks when you read, watch television, or use a computer. Close your eyes. Do not rub your eyes. Artificial tears may help you when you do these activities.  -Use wrap-around sunglasses to protect your eyes from the sun, wind, and grit  -Use a humidifier to increase the moisture in the air  -Avoid smoke, wind, hair dryers, air conditioners, and aerosol sprays  -Drink adequate amounts of water to avoid dehydration  -Do warm compresses 1-2 times daily. This will help loosen up the oils in your eyelids so they can make it to the surface of your eyeball. Process: Heat up a warm compress (uncooked rice in a new, clean sock) to a safe temperature in the microwave, place it over the closed eye for 5 minutes, remove it from the eye, and then massage your eyelids gently (put your finger on your bottom eyelid and roll up in  the direction of your eyeball, then put your finger on your top eyelid and roll down in the direction of your eyeball).   -Gently wash your eyelashes with baby shampoo daily. This will help remove debris from the exit points of the eyelid oil glands.

## 2022-09-09 NOTE — PROGRESS NOTES
Chief complaint   Red eye OD    HPI    Alexa Neel 57 year old male   In right eye. This started 2 months ago. Since onset it is stable. Associated symptoms include dryness, eye pain, and redness. Negative for photophobia. Treatments tried include artificial tears. Pain was noted as 0/10. (None currently). Sub conjunctival hemorrhage, right eye        Past ocular history   Prior eye surgery/laser/Trauma: No  CTL wearer:No  Glasses : progressive  Family Hx of eye disease: No    PMH     Past Medical History:   Diagnosis Date     Graves' disease     s/p I-131     hemorrhoids      Hyperlipidemia     early metabolic syndrome     Hypothyroidism      IGT (impaired glucose tolerance)      Lower urinary tract symptoms (LUTS) 1/17/2018     Tubular adenoma 03/2018       PSH     Past Surgical History:   Procedure Laterality Date     COLONOSCOPY N/A 4/20/2021    Procedure: COLONOSCOPY, WITH POLYPECTOMY;  Surgeon: Kevin Esquivel DO;  Location: [facility] OR     elective circumcision         Meds     Current Outpatient Medications   Medication     ipratropium (ATROVENT) 0.06 % nasal spray     levothyroxine (SYNTHROID/LEVOTHROID) 125 MCG tablet     mometasone (NASONEX) 50 MCG/ACT nasal spray     tamsulosin (FLOMAX) 0.4 MG capsule     No current facility-administered medications for this visit.       Labs   -    Imaging   -    Drops Currently Taking   Artificial tears BID    Assessment/Plan   #Dry eye syndrome OU  Patient feels that the dryness is mostly in the morning.  Exam showed decrease BUT    Plan:  -----  Advised to monitor BP  Instructions for treatment of dry eyes given.  Consider plugs on next visit if no improvement.      #Subconj hemorrhage right eye  Patient has HTN  Dryness may be a contributing factor      #High myopia  Stable vision  Patient does retinal exam every 2 years    Follow up:  Oph: 3 months VT    Attending Physician Attestation:  Complete documentation of historical and exam elements from today's encounter can  be found in the full encounter summary report (not reduplicated in this progress note).  I personally obtained the chief complaint(s) and history of present illness.  I confirmed and edited as necessary the review of systems, past medical/surgical history, family history, social history, and examination findings as documented by others; and I examined the patient myself.  I personally reviewed the relevant tests, images, and reports as documented above.  I formulated and edited as necessary the assessment and plan and discussed the findings and management plan with the patient and family. - Gerald Escalante MD

## 2022-09-19 ENCOUNTER — LAB (OUTPATIENT)
Dept: URGENT CARE | Facility: URGENT CARE | Age: 57
End: 2022-09-19
Attending: FAMILY MEDICINE
Payer: COMMERCIAL

## 2022-09-19 DIAGNOSIS — Z20.822 SUSPECTED 2019 NOVEL CORONAVIRUS INFECTION: ICD-10-CM

## 2022-09-19 PROCEDURE — 99207 PR NO CHARGE LOS: CPT

## 2022-09-19 PROCEDURE — U0005 INFEC AGEN DETEC AMPLI PROBE: HCPCS

## 2022-09-19 PROCEDURE — U0003 INFECTIOUS AGENT DETECTION BY NUCLEIC ACID (DNA OR RNA); SEVERE ACUTE RESPIRATORY SYNDROME CORONAVIRUS 2 (SARS-COV-2) (CORONAVIRUS DISEASE [COVID-19]), AMPLIFIED PROBE TECHNIQUE, MAKING USE OF HIGH THROUGHPUT TECHNOLOGIES AS DESCRIBED BY CMS-2020-01-R: HCPCS

## 2022-09-20 LAB — SARS-COV-2 RNA RESP QL NAA+PROBE: NEGATIVE

## 2022-11-20 ENCOUNTER — HEALTH MAINTENANCE LETTER (OUTPATIENT)
Age: 57
End: 2022-11-20

## 2023-02-21 ASSESSMENT — ENCOUNTER SYMPTOMS
DYSURIA: 0
DOUBLE VISION: 0
HEMATURIA: 0
SINUS CONGESTION: 1
EYE WATERING: 0
EYE PAIN: 0
EYE IRRITATION: 0
FLANK PAIN: 0
EYE REDNESS: 0
DIFFICULTY URINATING: 0

## 2023-02-22 ENCOUNTER — OFFICE VISIT (OUTPATIENT)
Dept: INTERNAL MEDICINE | Facility: CLINIC | Age: 58
End: 2023-02-22
Payer: COMMERCIAL

## 2023-02-22 ENCOUNTER — LAB (OUTPATIENT)
Dept: LAB | Facility: CLINIC | Age: 58
End: 2023-02-22
Payer: COMMERCIAL

## 2023-02-22 VITALS
HEIGHT: 70 IN | WEIGHT: 192.4 LBS | SYSTOLIC BLOOD PRESSURE: 153 MMHG | HEART RATE: 71 BPM | DIASTOLIC BLOOD PRESSURE: 88 MMHG | OXYGEN SATURATION: 98 % | BODY MASS INDEX: 27.54 KG/M2

## 2023-02-22 DIAGNOSIS — L57.0 KERATOSIS: ICD-10-CM

## 2023-02-22 DIAGNOSIS — E03.9 HYPOTHYROIDISM, UNSPECIFIED TYPE: ICD-10-CM

## 2023-02-22 DIAGNOSIS — E78.5 HYPERLIPIDEMIA, UNSPECIFIED HYPERLIPIDEMIA TYPE: Primary | ICD-10-CM

## 2023-02-22 DIAGNOSIS — E11.9 TYPE 2 DIABETES MELLITUS WITHOUT COMPLICATION, WITHOUT LONG-TERM CURRENT USE OF INSULIN (H): ICD-10-CM

## 2023-02-22 DIAGNOSIS — E78.5 HYPERLIPIDEMIA, UNSPECIFIED HYPERLIPIDEMIA TYPE: ICD-10-CM

## 2023-02-22 DIAGNOSIS — R03.0 WHITE COAT SYNDROME WITHOUT DIAGNOSIS OF HYPERTENSION: ICD-10-CM

## 2023-02-22 DIAGNOSIS — R73.02 IGT (IMPAIRED GLUCOSE TOLERANCE): ICD-10-CM

## 2023-02-22 DIAGNOSIS — R39.9 LOWER URINARY TRACT SYMPTOMS (LUTS): ICD-10-CM

## 2023-02-22 LAB
ALBUMIN SERPL BCG-MCNC: 4.8 G/DL (ref 3.5–5.2)
ALBUMIN UR-MCNC: NEGATIVE MG/DL
ALP SERPL-CCNC: 61 U/L (ref 40–129)
ALT SERPL W P-5'-P-CCNC: 36 U/L (ref 10–50)
ANION GAP SERPL CALCULATED.3IONS-SCNC: 10 MMOL/L (ref 7–15)
APPEARANCE UR: CLEAR
AST SERPL W P-5'-P-CCNC: 22 U/L (ref 10–50)
BASOPHILS # BLD AUTO: 0 10E3/UL (ref 0–0.2)
BASOPHILS NFR BLD AUTO: 1 %
BILIRUB SERPL-MCNC: 0.9 MG/DL
BILIRUB UR QL STRIP: NEGATIVE
BUN SERPL-MCNC: 13.2 MG/DL (ref 6–20)
CALCIUM SERPL-MCNC: 9.8 MG/DL (ref 8.6–10)
CHLORIDE SERPL-SCNC: 101 MMOL/L (ref 98–107)
CHOLEST SERPL-MCNC: 178 MG/DL
COLOR UR AUTO: ABNORMAL
CREAT SERPL-MCNC: 0.88 MG/DL (ref 0.67–1.17)
DEPRECATED HCO3 PLAS-SCNC: 28 MMOL/L (ref 22–29)
EOSINOPHIL # BLD AUTO: 0.1 10E3/UL (ref 0–0.7)
EOSINOPHIL NFR BLD AUTO: 2 %
ERYTHROCYTE [DISTWIDTH] IN BLOOD BY AUTOMATED COUNT: 12.5 % (ref 10–15)
GFR SERPL CREATININE-BSD FRML MDRD: >90 ML/MIN/1.73M2
GLUCOSE SERPL-MCNC: 148 MG/DL (ref 70–99)
GLUCOSE UR STRIP-MCNC: NEGATIVE MG/DL
HBA1C MFR BLD: 6.7 %
HCT VFR BLD AUTO: 45.7 % (ref 40–53)
HDLC SERPL-MCNC: 45 MG/DL
HGB BLD-MCNC: 15.3 G/DL (ref 13.3–17.7)
HGB UR QL STRIP: NEGATIVE
IMM GRANULOCYTES # BLD: 0 10E3/UL
IMM GRANULOCYTES NFR BLD: 0 %
KETONES UR STRIP-MCNC: NEGATIVE MG/DL
LDLC SERPL CALC-MCNC: 103 MG/DL
LEUKOCYTE ESTERASE UR QL STRIP: NEGATIVE
LYMPHOCYTES # BLD AUTO: 1.1 10E3/UL (ref 0.8–5.3)
LYMPHOCYTES NFR BLD AUTO: 22 %
MCH RBC QN AUTO: 30.8 PG (ref 26.5–33)
MCHC RBC AUTO-ENTMCNC: 33.5 G/DL (ref 31.5–36.5)
MCV RBC AUTO: 92 FL (ref 78–100)
MONOCYTES # BLD AUTO: 0.3 10E3/UL (ref 0–1.3)
MONOCYTES NFR BLD AUTO: 5 %
MUCOUS THREADS #/AREA URNS LPF: PRESENT /LPF
NEUTROPHILS # BLD AUTO: 3.5 10E3/UL (ref 1.6–8.3)
NEUTROPHILS NFR BLD AUTO: 70 %
NITRATE UR QL: NEGATIVE
NONHDLC SERPL-MCNC: 133 MG/DL
NRBC # BLD AUTO: 0 10E3/UL
NRBC BLD AUTO-RTO: 0 /100
PH UR STRIP: 7.5 [PH] (ref 5–7)
PLATELET # BLD AUTO: 155 10E3/UL (ref 150–450)
POTASSIUM SERPL-SCNC: 4.2 MMOL/L (ref 3.4–5.3)
PROT SERPL-MCNC: 7.4 G/DL (ref 6.4–8.3)
RBC # BLD AUTO: 4.97 10E6/UL (ref 4.4–5.9)
RBC URINE: <1 /HPF
SODIUM SERPL-SCNC: 139 MMOL/L (ref 136–145)
SP GR UR STRIP: 1.01 (ref 1–1.03)
SQUAMOUS EPITHELIAL: <1 /HPF
TRIGL SERPL-MCNC: 150 MG/DL
TSH SERPL DL<=0.005 MIU/L-ACNC: 2.22 UIU/ML (ref 0.3–4.2)
UROBILINOGEN UR STRIP-MCNC: NORMAL MG/DL
WBC # BLD AUTO: 5 10E3/UL (ref 4–11)
WBC URINE: <1 /HPF

## 2023-02-22 PROCEDURE — 80050 GENERAL HEALTH PANEL: CPT | Performed by: PATHOLOGY

## 2023-02-22 PROCEDURE — 83036 HEMOGLOBIN GLYCOSYLATED A1C: CPT | Mod: 90 | Performed by: PATHOLOGY

## 2023-02-22 PROCEDURE — 81001 URINALYSIS AUTO W/SCOPE: CPT | Performed by: PATHOLOGY

## 2023-02-22 PROCEDURE — 99396 PREV VISIT EST AGE 40-64: CPT | Performed by: INTERNAL MEDICINE

## 2023-02-22 PROCEDURE — 99000 SPECIMEN HANDLING OFFICE-LAB: CPT | Performed by: PATHOLOGY

## 2023-02-22 PROCEDURE — 36415 COLL VENOUS BLD VENIPUNCTURE: CPT | Performed by: PATHOLOGY

## 2023-02-22 PROCEDURE — 80061 LIPID PANEL: CPT | Performed by: PATHOLOGY

## 2023-02-22 RX ORDER — METFORMIN HCL 500 MG
500 TABLET, EXTENDED RELEASE 24 HR ORAL
Qty: 100 TABLET | Refills: 3 | Status: SHIPPED | OUTPATIENT
Start: 2023-02-22 | End: 2024-02-26

## 2023-02-22 RX ORDER — LEVOTHYROXINE SODIUM 125 UG/1
125 TABLET ORAL DAILY
Qty: 90 TABLET | Refills: 3 | Status: SHIPPED | OUTPATIENT
Start: 2023-02-22 | End: 2024-02-26

## 2023-02-22 RX ORDER — TAMSULOSIN HYDROCHLORIDE 0.4 MG/1
0.8 CAPSULE ORAL DAILY
Qty: 180 CAPSULE | Refills: 3 | Status: SHIPPED | OUTPATIENT
Start: 2023-02-22 | End: 2024-02-26

## 2023-02-22 NOTE — NURSING NOTE
"Alexa Shaikh is a 57 year old male patient that presents today in clinic for the following:    Chief Complaint   Patient presents with     Physical     Pt presents for annual physical     Refill Request     The patient's allergies and medications were reviewed as noted. A set of vitals were recorded as noted without incident: BP (!) 153/88 (BP Location: Right arm, Patient Position: Sitting, Cuff Size: Adult Regular)   Pulse 71   Ht 1.772 m (5' 9.76\")   Wt 87.3 kg (192 lb 6.4 oz)   SpO2 98%   BMI 27.79 kg/m  . The patient does not have any other questions for the provider.    Lane London, Visit Facilitator 8:04 AM on 2/22/2023   "

## 2023-02-22 NOTE — PROGRESS NOTES
HPI  57-year-old presents today for physical examination.  He is having ongoing issues with lower urinary tract symptoms.  He continues to get up to maybe 3 times a night despite taking the tamsulosin.  Taking the tamsulosin 0.8 mg at bedtime and this has been associated with some intermittent straining at stools as well as some nasal congestion.  When his nose feels more congested he also tends to note that his blood pressure tends to run a little higher.  He periodically is checking his blood pressure at home and it tends to be running in the 120s to 140s systolic.  He has never had a 24-hour blood pressure monitor study and we discussed this.  He said no problems on his thyroid medication is due for reassessment of this.  He is doing speed walking at least 3 days a week for exercise his diet is low in meat and tends to be higher in fruits and vegetables he does not drink any significant alcohol does not use tobacco.  His sleep is fragmented by his nocturia.  Past Medical History:   Diagnosis Date     Graves' disease     s/p I-131     hemorrhoids      Hyperlipidemia     early metabolic syndrome     Hypothyroidism      IGT (impaired glucose tolerance)      Lower urinary tract symptoms (LUTS) 2018     Tubular adenoma 2018     Past Surgical History:   Procedure Laterality Date     COLONOSCOPY N/A 2021    Procedure: COLONOSCOPY, WITH POLYPECTOMY;  Surgeon: Kevin Esquivel DO;  Location: [facility] OR     elective circumcision       Family History   Problem Relation Age of Onset     Cancer Mother         throat cancer      Thyroid Disease Mother         Graves d     Diabetes Mother      Hypertension Father      Diabetes Brother      Thyroid Disease Sister         Graves d     Glaucoma No family hx of      Macular Degeneration No family hx of      Answers for HPI/ROS submitted by the patient on 2023  General Symptoms: No  Skin Symptoms: No  HENT Symptoms: Yes  EYE SYMPTOMS: Yes  HEART SYMPTOMS: No  LUNG  "SYMPTOMS: No  INTESTINAL SYMPTOMS: No  URINARY SYMPTOMS: Yes  REPRODUCTIVE SYMPTOMS: No  SKELETAL SYMPTOMS: No  BLOOD SYMPTOMS: No  NERVOUS SYSTEM SYMPTOMS: No  MENTAL HEALTH SYMPTOMS: No  Congestion: Yes  Eye pain: No  Vision loss: No  Dry eyes: Yes  Watery eyes: No  Eye bulging: No  Double vision: No  Flashing of lights: No  Spots: No  Floaters: No  Redness: No  Crossed eyes: Yes  Tunnel Vision: No  Yellowing of eyes: No  Eye irritation: No  Trouble holding urine or incontinence: No  Pain or burning: No  Trouble starting or stopping: Yes  Increased frequency of urination: Yes  Blood in urine: No  Decreased frequency of urination: No  Frequent nighttime urination: Yes  Flank pain: No  Difficulty emptying bladder: No        Exam:  BP (!) 153/88 (BP Location: Right arm, Patient Position: Sitting, Cuff Size: Adult Regular)   Pulse 71   Ht 1.772 m (5' 9.76\")   Wt 87.3 kg (192 lb 6.4 oz)   SpO2 98%   BMI 27.79 kg/m    192 lbs 6.4 oz  Physical Exam   The patient is alert, oriented with a clear sensorium.   Skin shows no lesions or rashes and good turgor.   Head is normocephalic and atraumatic.  Does have a 1 cm raised benign-appearing keratosis in the mid scalp  Eyes show PERRLA with benign optic fundi.   Ears show normal TMs bilaterally.   Mouth shows clear oral mucosa.   Neck shows no nodes, thyromegaly or bruits.   Back is non tender.  Lungs are clear to percussion and auscultation.   Heart shows normal S1 and S2 without murmur or gallop.   Abdomen is soft and nontender without masses or organomegaly.   Genitalia show normal testes. No evidence of inguinal hernia.  Rectal shows large smooth prostate without nodules or masses.  Extremities show no edema and no evidence of active synovitis.   Neurologic examination shows cranial nerves II-XII intact. Motor shows 5/5 strength. Reflexes are symmetric. Cerebellar testing shows normal tandem gait.  Romberg negative.      ASSESSMENT  1 hypothyroidism on 125 mcg daily  2 " hyperlipidemia   3 new onset diabetes  4 tubular adenoma due for colonoscopy 2028  5 CMC DJD both thumbs  6.Hypertension white coat  7 history of mesenteric panniculitis   8 BPH with LUTS on tamsulosin  9 Nasal congestion secondary tamsulosin  10 Scalp keratosis    Plan  We will get a 24 blood pressure monitor to assess his current blood pressure status.  He is due for a Tdap encouraged him to get this.  We will reassess his labs today and refer to dermatology regarding the keratosis plan to have him reassessed in a year.  For the lower urinary tract symptoms.  Having taken 1000 mg of acetaminophen at bedtime if that does not help with the urge to void will add Tadalafil 5 mg daily    This note was completed using Dragon voice recognition software.      Ritesh Paniagua MD  General Internal Medicine  [facility]  [phone]

## 2023-06-02 ENCOUNTER — HEALTH MAINTENANCE LETTER (OUTPATIENT)
Age: 58
End: 2023-06-02

## 2023-06-05 ENCOUNTER — TRANSFERRED RECORDS (OUTPATIENT)
Dept: HEALTH INFORMATION MANAGEMENT | Facility: CLINIC | Age: 58
End: 2023-06-05

## 2023-06-05 LAB — RETINOPATHY: NEGATIVE

## 2023-09-16 ENCOUNTER — HEALTH MAINTENANCE LETTER (OUTPATIENT)
Age: 58
End: 2023-09-16

## 2023-10-23 ENCOUNTER — TELEPHONE (OUTPATIENT)
Dept: INTERNAL MEDICINE | Facility: CLINIC | Age: 58
End: 2023-10-23
Payer: COMMERCIAL

## 2023-11-07 ENCOUNTER — LAB (OUTPATIENT)
Dept: LAB | Facility: CLINIC | Age: 58
End: 2023-11-07
Payer: COMMERCIAL

## 2023-11-07 DIAGNOSIS — E11.9 TYPE 2 DIABETES MELLITUS WITHOUT COMPLICATION, WITHOUT LONG-TERM CURRENT USE OF INSULIN (H): ICD-10-CM

## 2023-11-07 LAB
ANION GAP SERPL CALCULATED.3IONS-SCNC: 10 MMOL/L (ref 7–15)
BUN SERPL-MCNC: 11.6 MG/DL (ref 6–20)
CALCIUM SERPL-MCNC: 9.6 MG/DL (ref 8.6–10)
CHLORIDE SERPL-SCNC: 100 MMOL/L (ref 98–107)
CREAT SERPL-MCNC: 0.86 MG/DL (ref 0.67–1.17)
DEPRECATED HCO3 PLAS-SCNC: 28 MMOL/L (ref 22–29)
EGFRCR SERPLBLD CKD-EPI 2021: >90 ML/MIN/1.73M2
GLUCOSE SERPL-MCNC: 122 MG/DL (ref 70–99)
HBA1C MFR BLD: 6.4 %
POTASSIUM SERPL-SCNC: 4.2 MMOL/L (ref 3.4–5.3)
SODIUM SERPL-SCNC: 138 MMOL/L (ref 135–145)

## 2023-11-07 PROCEDURE — 36415 COLL VENOUS BLD VENIPUNCTURE: CPT | Performed by: PATHOLOGY

## 2023-11-07 PROCEDURE — 80048 BASIC METABOLIC PNL TOTAL CA: CPT | Performed by: PATHOLOGY

## 2023-11-07 PROCEDURE — 99000 SPECIMEN HANDLING OFFICE-LAB: CPT | Performed by: PATHOLOGY

## 2023-11-07 PROCEDURE — 83036 HEMOGLOBIN GLYCOSYLATED A1C: CPT | Performed by: INTERNAL MEDICINE

## 2023-12-20 NOTE — TELEPHONE ENCOUNTER
Attempted to contact patient regarding upcoming colonoscopy procedure on 4/20/21 for pre assessment questions. No answer.     Left message to return call to [phone] #[phone]    Covid test scheduled 4/16/21    Sapna Benjamin RN       yes

## 2024-02-25 SDOH — HEALTH STABILITY: PHYSICAL HEALTH: ON AVERAGE, HOW MANY MINUTES DO YOU ENGAGE IN EXERCISE AT THIS LEVEL?: 30 MIN

## 2024-02-25 SDOH — HEALTH STABILITY: PHYSICAL HEALTH: ON AVERAGE, HOW MANY DAYS PER WEEK DO YOU ENGAGE IN MODERATE TO STRENUOUS EXERCISE (LIKE A BRISK WALK)?: 4 DAYS

## 2024-02-25 ASSESSMENT — SOCIAL DETERMINANTS OF HEALTH (SDOH): HOW OFTEN DO YOU GET TOGETHER WITH FRIENDS OR RELATIVES?: ONCE A WEEK

## 2024-02-26 ENCOUNTER — LAB (OUTPATIENT)
Dept: LAB | Facility: CLINIC | Age: 59
End: 2024-02-26
Payer: COMMERCIAL

## 2024-02-26 ENCOUNTER — OFFICE VISIT (OUTPATIENT)
Dept: INTERNAL MEDICINE | Facility: CLINIC | Age: 59
End: 2024-02-26
Payer: COMMERCIAL

## 2024-02-26 VITALS
HEART RATE: 105 BPM | DIASTOLIC BLOOD PRESSURE: 90 MMHG | SYSTOLIC BLOOD PRESSURE: 142 MMHG | HEIGHT: 69 IN | BODY MASS INDEX: 27.61 KG/M2 | OXYGEN SATURATION: 98 % | WEIGHT: 186.4 LBS

## 2024-02-26 DIAGNOSIS — R10.32 ABDOMINAL PAIN, LEFT LOWER QUADRANT: ICD-10-CM

## 2024-02-26 DIAGNOSIS — E78.5 HYPERLIPIDEMIA, UNSPECIFIED HYPERLIPIDEMIA TYPE: ICD-10-CM

## 2024-02-26 DIAGNOSIS — R73.02 IGT (IMPAIRED GLUCOSE TOLERANCE): ICD-10-CM

## 2024-02-26 DIAGNOSIS — R39.9 LOWER URINARY TRACT SYMPTOMS (LUTS): ICD-10-CM

## 2024-02-26 DIAGNOSIS — M79.3 PANNICULITIS: ICD-10-CM

## 2024-02-26 DIAGNOSIS — E03.9 HYPOTHYROIDISM, UNSPECIFIED TYPE: ICD-10-CM

## 2024-02-26 DIAGNOSIS — E03.9 HYPOTHYROIDISM, UNSPECIFIED TYPE: Primary | ICD-10-CM

## 2024-02-26 DIAGNOSIS — R33.9 URINARY RETENTION: ICD-10-CM

## 2024-02-26 DIAGNOSIS — E11.9 TYPE 2 DIABETES MELLITUS WITHOUT COMPLICATION, WITHOUT LONG-TERM CURRENT USE OF INSULIN (H): ICD-10-CM

## 2024-02-26 LAB
ALBUMIN SERPL BCG-MCNC: 4.7 G/DL (ref 3.5–5.2)
ALBUMIN UR-MCNC: NEGATIVE MG/DL
ALP SERPL-CCNC: 64 U/L (ref 40–150)
ALT SERPL W P-5'-P-CCNC: 22 U/L (ref 0–70)
ANION GAP SERPL CALCULATED.3IONS-SCNC: 10 MMOL/L (ref 7–15)
APPEARANCE UR: CLEAR
AST SERPL W P-5'-P-CCNC: 21 U/L (ref 0–45)
BILIRUB SERPL-MCNC: 0.7 MG/DL
BILIRUB UR QL STRIP: NEGATIVE
BUN SERPL-MCNC: 12.5 MG/DL (ref 6–20)
CALCIUM SERPL-MCNC: 9.4 MG/DL (ref 8.6–10)
CHLORIDE SERPL-SCNC: 103 MMOL/L (ref 98–107)
CHOLEST SERPL-MCNC: 178 MG/DL
CK SERPL-CCNC: 105 U/L (ref 39–308)
COLOR UR AUTO: ABNORMAL
CREAT SERPL-MCNC: 0.85 MG/DL (ref 0.67–1.17)
CRP SERPL-MCNC: <3 MG/L
DEPRECATED HCO3 PLAS-SCNC: 26 MMOL/L (ref 22–29)
EGFRCR SERPLBLD CKD-EPI 2021: >90 ML/MIN/1.73M2
ERYTHROCYTE [SEDIMENTATION RATE] IN BLOOD BY WESTERGREN METHOD: 5 MM/HR (ref 0–20)
FASTING STATUS PATIENT QL REPORTED: YES
GLUCOSE SERPL-MCNC: 132 MG/DL (ref 70–99)
GLUCOSE UR STRIP-MCNC: NEGATIVE MG/DL
HBA1C MFR BLD: 6.7 %
HDLC SERPL-MCNC: 48 MG/DL
HGB UR QL STRIP: NEGATIVE
KETONES UR STRIP-MCNC: NEGATIVE MG/DL
LDLC SERPL CALC-MCNC: 112 MG/DL
LEUKOCYTE ESTERASE UR QL STRIP: NEGATIVE
MUCOUS THREADS #/AREA URNS LPF: PRESENT /LPF
NITRATE UR QL: NEGATIVE
NONHDLC SERPL-MCNC: 130 MG/DL
PH UR STRIP: 7 [PH] (ref 5–7)
POTASSIUM SERPL-SCNC: 4 MMOL/L (ref 3.4–5.3)
PROT SERPL-MCNC: 7.1 G/DL (ref 6.4–8.3)
RBC URINE: 1 /HPF
SODIUM SERPL-SCNC: 139 MMOL/L (ref 135–145)
SP GR UR STRIP: 1.01 (ref 1–1.03)
TRIGL SERPL-MCNC: 89 MG/DL
TSH SERPL DL<=0.005 MIU/L-ACNC: 1.47 UIU/ML (ref 0.3–4.2)
UROBILINOGEN UR STRIP-MCNC: NORMAL MG/DL
WBC URINE: <1 /HPF

## 2024-02-26 PROCEDURE — 84460 ALANINE AMINO (ALT) (SGPT): CPT | Performed by: INTERNAL MEDICINE

## 2024-02-26 PROCEDURE — 99396 PREV VISIT EST AGE 40-64: CPT | Performed by: INTERNAL MEDICINE

## 2024-02-26 PROCEDURE — 85652 RBC SED RATE AUTOMATED: CPT | Performed by: PATHOLOGY

## 2024-02-26 PROCEDURE — 83036 HEMOGLOBIN GLYCOSYLATED A1C: CPT | Performed by: INTERNAL MEDICINE

## 2024-02-26 PROCEDURE — 99000 SPECIMEN HANDLING OFFICE-LAB: CPT | Performed by: PATHOLOGY

## 2024-02-26 PROCEDURE — 84443 ASSAY THYROID STIM HORMONE: CPT | Performed by: INTERNAL MEDICINE

## 2024-02-26 PROCEDURE — 82550 ASSAY OF CK (CPK): CPT | Performed by: INTERNAL MEDICINE

## 2024-02-26 PROCEDURE — 86140 C-REACTIVE PROTEIN: CPT | Performed by: INTERNAL MEDICINE

## 2024-02-26 PROCEDURE — 36415 COLL VENOUS BLD VENIPUNCTURE: CPT | Performed by: PATHOLOGY

## 2024-02-26 PROCEDURE — 82465 ASSAY BLD/SERUM CHOLESTEROL: CPT | Performed by: INTERNAL MEDICINE

## 2024-02-26 PROCEDURE — 81001 URINALYSIS AUTO W/SCOPE: CPT | Performed by: PATHOLOGY

## 2024-02-26 RX ORDER — TAMSULOSIN HYDROCHLORIDE 0.4 MG/1
0.8 CAPSULE ORAL DAILY
Qty: 180 CAPSULE | Refills: 3 | Status: SHIPPED | OUTPATIENT
Start: 2024-02-26

## 2024-02-26 RX ORDER — TADALAFIL 5 MG/1
5 TABLET ORAL EVERY 24 HOURS
Qty: 90 TABLET | Refills: 3 | Status: SHIPPED | OUTPATIENT
Start: 2024-02-26

## 2024-02-26 RX ORDER — LEVOTHYROXINE SODIUM 125 UG/1
125 TABLET ORAL DAILY
Qty: 90 TABLET | Refills: 3 | Status: SHIPPED | OUTPATIENT
Start: 2024-02-26

## 2024-02-26 NOTE — PATIENT INSTRUCTIONS
You should expect a call for scheduling your CT, however their number is [phone] if you need it

## 2024-02-26 NOTE — PROGRESS NOTES
HPI  58-year-old presents today for physical examination with a couple concerns.  He is continuing to have ongoing issues with lower urinary tract symptoms.  He has nocturia only a couple times at night but he has a lot of urgency and frequency during the day the urinary urgency also tends to be associated with the need to defecate.  He is not had any fever chills or sweats no dysuria at all.  He continues to take the tamsulosin has not taken the tadalafil.  Otherwise he is noted some recurrence of the left lower quadrant abdominal discomfort this is similar to what he had in 2019 when he had mesenteric panniculitis.  No definitive aggravating or alleviating factors to that however.  There is been no fever chills or sweats no chest or change in bowel movements other than the weight voiding and defecation simultaneously.  No blood in the stools black stools or diarrhea or pain with bowel movements.  He did not tolerate the metformin.  He developed diarrhea and abdominal pain and instead more aggressively monitored his diet and exercise has managed to lose some weight and has shown some improvement recently in the A1c.  No problems on the thyroid supplementation  Past Medical History:   Diagnosis Date    Graves' disease     s/p I-131    hemorrhoids     Hyperlipidemia     early metabolic syndrome    Hypothyroidism     IGT (impaired glucose tolerance)     Lower urinary tract symptoms (LUTS) 2018    Tubular adenoma 2018     Past Surgical History:   Procedure Laterality Date    COLONOSCOPY N/A 2021    Procedure: COLONOSCOPY, WITH POLYPECTOMY;  Surgeon: Kevin Esquivel DO;  Location: [facility] OR    elective circumcision       Family History   Problem Relation Age of Onset    Cancer Mother         throat cancer     Thyroid Disease Mother         Graves d    Diabetes Mother     Hypertension Father     Diabetes Brother     Thyroid Disease Sister         Graves d    Glaucoma No family hx of     Macular Degeneration  "No family hx of          Exam:  BP (!) 142/90 (BP Location: Right arm, Patient Position: Sitting, Cuff Size: Adult Regular)   Pulse 105   Ht 1.762 m (5' 9.37\")   Wt 84.6 kg (186 lb 6.4 oz)   SpO2 98%   BMI 27.23 kg/m    186 lbs 6.4 oz  Physical Exam   The patient is alert, oriented with a clear sensorium.   Skin shows no lesions or rashes and good turgor.   Head is normocephalic and atraumatic.   Eyes show PERRLA  Ears show normal TMs bilaterally.   Mouth shows clear oral mucosa.   Neck shows no nodes, thyromegaly or bruits.   Lungs are clear to percussion and auscultation.   Heart shows normal S1 and S2 without murmur or gallop.   Abdomen is soft and nontender without masses or organomegaly.   Genitalia show normal testes. No evidence of inguinal hernia.  Rectal shows large smooth prostate without nodules or masses.  Extremities show no edema and no evidence of active synovitis.   Neurologic examination shows cranial nerves II-XII intact. Motor shows 5/5 strength. Reflexes are symmetric. Cerebellar testing shows normal tandem gait.  Romberg negative.  Postvoid residual was 204 cc    Labs reviewed:  Results for orders placed or performed in visit on 02/26/24   Hemoglobin A1c     Status: Abnormal   Result Value Ref Range    Hemoglobin A1C 6.7 (H) <5.7 %   Lipid Profile     Status: Abnormal   Result Value Ref Range    Cholesterol 178 <200 mg/dL    Triglycerides 89 <150 mg/dL    Direct Measure HDL 48 >=40 mg/dL    LDL Cholesterol Calculated 112 (H) <=100 mg/dL    Non HDL Cholesterol 130 (H) <130 mg/dL    Patient Fasting > 8hrs? Yes     Narrative    Cholesterol  Desirable:  <200 mg/dL    Triglycerides  Normal:  Less than 150 mg/dL  Borderline High:  150-199 mg/dL  High:  200-499 mg/dL  Very High:  Greater than or equal to 500 mg/dL    Direct Measure HDL  Female:  Greater than or equal to 50 mg/dL   Male:  Greater than or equal to 40 mg/dL    LDL Cholesterol  Desirable:  <100mg/dL  Above Desirable:  100-129 mg/dL "   Borderline High:  130-159 mg/dL   High:  160-189 mg/dL   Very High:  >= 190 mg/dL    Non HDL Cholesterol  Desirable:  130 mg/dL  Above Desirable:  130-159 mg/dL  Borderline High:  160-189 mg/dL  High:  190-219 mg/dL  Very High:  Greater than or equal to 220 mg/dL   UA with Microscopic reflex to Culture     Status: Abnormal    Specimen: Urine, Midstream   Result Value Ref Range    Color Urine Light Yellow Colorless, Straw, Light Yellow, Yellow    Appearance Urine Clear Clear    Glucose Urine Negative Negative mg/dL    Bilirubin Urine Negative Negative    Ketones Urine Negative Negative mg/dL    Specific Gravity Urine 1.011 1.003 - 1.035    Blood Urine Negative Negative    pH Urine 7.0 5.0 - 7.0    Protein Albumin Urine Negative Negative mg/dL    Urobilinogen Urine Normal Normal, 2.0 mg/dL    Nitrite Urine Negative Negative    Leukocyte Esterase Urine Negative Negative    Mucus Urine Present (A) None Seen /LPF    RBC Urine 1 <=2 /HPF    WBC Urine <1 <=5 /HPF    Narrative    Urine Culture not indicated   CK total     Status: Normal   Result Value Ref Range     39 - 308 U/L   Comprehensive metabolic panel     Status: Abnormal   Result Value Ref Range    Sodium 139 135 - 145 mmol/L    Potassium 4.0 3.4 - 5.3 mmol/L    Carbon Dioxide (CO2) 26 22 - 29 mmol/L    Anion Gap 10 7 - 15 mmol/L    Urea Nitrogen 12.5 6.0 - 20.0 mg/dL    Creatinine 0.85 0.67 - 1.17 mg/dL    GFR Estimate >90 >60 mL/min/1.73m2    Calcium 9.4 8.6 - 10.0 mg/dL    Chloride 103 98 - 107 mmol/L    Glucose 132 (H) 70 - 99 mg/dL    Alkaline Phosphatase 64 40 - 150 U/L    AST 21 0 - 45 U/L    ALT 22 0 - 70 U/L    Protein Total 7.1 6.4 - 8.3 g/dL    Albumin 4.7 3.5 - 5.2 g/dL    Bilirubin Total 0.7 <=1.2 mg/dL   CRP inflammation     Status: Normal   Result Value Ref Range    CRP Inflammation <3.00 <5.00 mg/L   Erythrocyte sedimentation rate auto     Status: Normal   Result Value Ref Range    Erythrocyte Sedimentation Rate 5 0 - 20 mm/hr   TSH      Status: Normal   Result Value Ref Range    TSH 1.47 0.30 - 4.20 uIU/mL       ASSESSMENT  1 hypothyroidism on 125 mcg daily  2 hyperlipidemia will start rosuvastatin  3 Diabetes controlled with lifestyle  4 tubular adenoma due for colonoscopy 2028  5 [facility] DJD both thumbs  6.Hypertension   7 history of mesenteric panniculitis   8 BPH with LUTS and urinary retention      Plan  Will add tadalafil 5 mg daily in the morning to his tamsulosin at bedtime we discussed the use of finasteride but will hold off on this pending results of his CT to further evaluate the abdominal pain history of panniculitis and exclude any hydronephrosis.  Will reassess his labs he is current on his immunizations    This note was completed using Dragon voice recognition software.      Ritesh Paniagua MD  General Internal Medicine  [facility]  [phone]

## 2024-02-27 RX ORDER — ROSUVASTATIN CALCIUM 10 MG/1
10 TABLET, COATED ORAL DAILY
Qty: 90 TABLET | Refills: 3 | Status: SHIPPED | OUTPATIENT
Start: 2024-02-27

## 2024-03-08 ENCOUNTER — ANCILLARY PROCEDURE (OUTPATIENT)
Dept: CT IMAGING | Facility: CLINIC | Age: 59
End: 2024-03-08
Attending: INTERNAL MEDICINE
Payer: COMMERCIAL

## 2024-03-08 DIAGNOSIS — R10.32 ABDOMINAL PAIN, LEFT LOWER QUADRANT: ICD-10-CM

## 2024-03-08 DIAGNOSIS — R33.9 URINARY RETENTION: ICD-10-CM

## 2024-03-08 PROCEDURE — 74177 CT ABD & PELVIS W/CONTRAST: CPT | Performed by: STUDENT IN AN ORGANIZED HEALTH CARE EDUCATION/TRAINING PROGRAM

## 2024-03-08 RX ORDER — IOPAMIDOL 755 MG/ML
91 INJECTION, SOLUTION INTRAVASCULAR ONCE
Status: COMPLETED | OUTPATIENT
Start: 2024-03-08 | End: 2024-03-08

## 2024-03-08 RX ADMIN — IOPAMIDOL 91 ML: 755 INJECTION, SOLUTION INTRAVASCULAR at 17:52

## 2024-03-08 NOTE — DISCHARGE INSTRUCTIONS

## 2024-06-10 ENCOUNTER — TRANSFERRED RECORDS (OUTPATIENT)
Dept: HEALTH INFORMATION MANAGEMENT | Facility: CLINIC | Age: 59
End: 2024-06-10
Payer: COMMERCIAL

## 2024-06-22 ENCOUNTER — HEALTH MAINTENANCE LETTER (OUTPATIENT)
Age: 59
End: 2024-06-22

## 2024-07-02 ENCOUNTER — LAB (OUTPATIENT)
Dept: LAB | Facility: CLINIC | Age: 59
End: 2024-07-02
Payer: COMMERCIAL

## 2024-07-02 DIAGNOSIS — E78.5 HYPERLIPIDEMIA, UNSPECIFIED HYPERLIPIDEMIA TYPE: ICD-10-CM

## 2024-07-02 DIAGNOSIS — E11.9 TYPE 2 DIABETES MELLITUS WITHOUT COMPLICATION, WITHOUT LONG-TERM CURRENT USE OF INSULIN (H): Primary | ICD-10-CM

## 2024-07-02 LAB
CHOLEST SERPL-MCNC: 183 MG/DL
CREAT UR-MCNC: 47.9 MG/DL
FASTING STATUS PATIENT QL REPORTED: YES
HBA1C MFR BLD: 6.3 %
HDLC SERPL-MCNC: 54 MG/DL
LDLC SERPL CALC-MCNC: 114 MG/DL
MICROALBUMIN UR-MCNC: <12 MG/L
MICROALBUMIN/CREAT UR: NORMAL MG/G{CREAT}
NONHDLC SERPL-MCNC: 129 MG/DL
TRIGL SERPL-MCNC: 75 MG/DL

## 2024-07-02 PROCEDURE — 82043 UR ALBUMIN QUANTITATIVE: CPT | Performed by: INTERNAL MEDICINE

## 2024-07-02 PROCEDURE — 80061 LIPID PANEL: CPT | Performed by: PATHOLOGY

## 2024-07-02 PROCEDURE — 83036 HEMOGLOBIN GLYCOSYLATED A1C: CPT | Performed by: INTERNAL MEDICINE

## 2024-07-02 PROCEDURE — 36415 COLL VENOUS BLD VENIPUNCTURE: CPT | Performed by: PATHOLOGY

## 2024-07-02 PROCEDURE — 99000 SPECIMEN HANDLING OFFICE-LAB: CPT | Performed by: PATHOLOGY

## 2024-11-09 ENCOUNTER — HEALTH MAINTENANCE LETTER (OUTPATIENT)
Age: 59
End: 2024-11-09

## 2024-12-11 ENCOUNTER — LAB (OUTPATIENT)
Dept: LAB | Facility: CLINIC | Age: 59
End: 2024-12-11
Payer: COMMERCIAL

## 2024-12-11 DIAGNOSIS — E78.5 HYPERLIPIDEMIA, UNSPECIFIED HYPERLIPIDEMIA TYPE: ICD-10-CM

## 2024-12-11 DIAGNOSIS — E11.9 TYPE 2 DIABETES MELLITUS WITHOUT COMPLICATION, WITHOUT LONG-TERM CURRENT USE OF INSULIN (H): ICD-10-CM

## 2024-12-11 DIAGNOSIS — A04.8 H. PYLORI INFECTION: ICD-10-CM

## 2024-12-16 PROCEDURE — 99000 SPECIMEN HANDLING OFFICE-LAB: CPT | Performed by: PATHOLOGY

## 2024-12-16 PROCEDURE — 87338 HPYLORI STOOL AG IA: CPT | Performed by: INTERNAL MEDICINE

## 2024-12-17 LAB — H PYLORI AG STL QL IA: NEGATIVE

## 2025-02-23 SDOH — HEALTH STABILITY: PHYSICAL HEALTH: ON AVERAGE, HOW MANY MINUTES DO YOU ENGAGE IN EXERCISE AT THIS LEVEL?: 20 MIN

## 2025-02-23 SDOH — HEALTH STABILITY: PHYSICAL HEALTH: ON AVERAGE, HOW MANY DAYS PER WEEK DO YOU ENGAGE IN MODERATE TO STRENUOUS EXERCISE (LIKE A BRISK WALK)?: 2 DAYS

## 2025-02-23 ASSESSMENT — SOCIAL DETERMINANTS OF HEALTH (SDOH): HOW OFTEN DO YOU GET TOGETHER WITH FRIENDS OR RELATIVES?: ONCE A WEEK

## 2025-02-24 ENCOUNTER — LAB (OUTPATIENT)
Dept: LAB | Facility: CLINIC | Age: 60
End: 2025-02-24
Payer: COMMERCIAL

## 2025-02-24 ENCOUNTER — OFFICE VISIT (OUTPATIENT)
Dept: INTERNAL MEDICINE | Facility: CLINIC | Age: 60
End: 2025-02-24
Payer: COMMERCIAL

## 2025-02-24 VITALS
BODY MASS INDEX: 26.44 KG/M2 | HEIGHT: 70 IN | SYSTOLIC BLOOD PRESSURE: 128 MMHG | DIASTOLIC BLOOD PRESSURE: 77 MMHG | OXYGEN SATURATION: 98 % | WEIGHT: 184.7 LBS | RESPIRATION RATE: 16 BRPM | HEART RATE: 86 BPM | TEMPERATURE: 98.2 F

## 2025-02-24 DIAGNOSIS — R33.9 URINARY RETENTION: ICD-10-CM

## 2025-02-24 DIAGNOSIS — E78.5 HYPERLIPIDEMIA, UNSPECIFIED HYPERLIPIDEMIA TYPE: ICD-10-CM

## 2025-02-24 DIAGNOSIS — E03.9 HYPOTHYROIDISM, UNSPECIFIED TYPE: ICD-10-CM

## 2025-02-24 DIAGNOSIS — M79.3 PANNICULITIS: ICD-10-CM

## 2025-02-24 DIAGNOSIS — R06.81 APNEA: ICD-10-CM

## 2025-02-24 DIAGNOSIS — R73.02 IGT (IMPAIRED GLUCOSE TOLERANCE): ICD-10-CM

## 2025-02-24 DIAGNOSIS — R06.83 SNORING: ICD-10-CM

## 2025-02-24 DIAGNOSIS — E78.5 HYPERLIPIDEMIA, UNSPECIFIED HYPERLIPIDEMIA TYPE: Primary | ICD-10-CM

## 2025-02-24 DIAGNOSIS — R39.9 LOWER URINARY TRACT SYMPTOMS (LUTS): ICD-10-CM

## 2025-02-24 LAB
ALBUMIN SERPL BCG-MCNC: 4.7 G/DL (ref 3.5–5.2)
ALP SERPL-CCNC: 63 U/L (ref 40–150)
ALT SERPL W P-5'-P-CCNC: 23 U/L (ref 0–70)
ANION GAP SERPL CALCULATED.3IONS-SCNC: 10 MMOL/L (ref 7–15)
AST SERPL W P-5'-P-CCNC: 21 U/L (ref 0–45)
BASOPHILS # BLD AUTO: 0 10E3/UL (ref 0–0.2)
BASOPHILS NFR BLD AUTO: 1 %
BILIRUB SERPL-MCNC: 0.9 MG/DL
BUN SERPL-MCNC: 14.1 MG/DL (ref 8–23)
CALCIUM SERPL-MCNC: 9.3 MG/DL (ref 8.8–10.4)
CHLORIDE SERPL-SCNC: 103 MMOL/L (ref 98–107)
CHOLEST SERPL-MCNC: 166 MG/DL
CREAT SERPL-MCNC: 0.83 MG/DL (ref 0.67–1.17)
EGFRCR SERPLBLD CKD-EPI 2021: >90 ML/MIN/1.73M2
EOSINOPHIL # BLD AUTO: 0.1 10E3/UL (ref 0–0.7)
EOSINOPHIL NFR BLD AUTO: 2 %
ERYTHROCYTE [DISTWIDTH] IN BLOOD BY AUTOMATED COUNT: 12.3 % (ref 10–15)
EST. AVERAGE GLUCOSE BLD GHB EST-MCNC: 134 MG/DL
FASTING STATUS PATIENT QL REPORTED: YES
FASTING STATUS PATIENT QL REPORTED: YES
GLUCOSE SERPL-MCNC: 127 MG/DL (ref 70–99)
HBA1C MFR BLD: 6.3 %
HCO3 SERPL-SCNC: 27 MMOL/L (ref 22–29)
HCT VFR BLD AUTO: 45.4 % (ref 40–53)
HDLC SERPL-MCNC: 50 MG/DL
HGB BLD-MCNC: 15 G/DL (ref 13.3–17.7)
IMM GRANULOCYTES # BLD: 0 10E3/UL
IMM GRANULOCYTES NFR BLD: 0 %
LDLC SERPL CALC-MCNC: 96 MG/DL
LYMPHOCYTES # BLD AUTO: 0.9 10E3/UL (ref 0.8–5.3)
LYMPHOCYTES NFR BLD AUTO: 21 %
MCH RBC QN AUTO: 30.7 PG (ref 26.5–33)
MCHC RBC AUTO-ENTMCNC: 33 G/DL (ref 31.5–36.5)
MCV RBC AUTO: 93 FL (ref 78–100)
MONOCYTES # BLD AUTO: 0.2 10E3/UL (ref 0–1.3)
MONOCYTES NFR BLD AUTO: 5 %
NEUTROPHILS # BLD AUTO: 2.9 10E3/UL (ref 1.6–8.3)
NEUTROPHILS NFR BLD AUTO: 71 %
NONHDLC SERPL-MCNC: 116 MG/DL
NRBC # BLD AUTO: 0 10E3/UL
NRBC BLD AUTO-RTO: 0 /100
PLATELET # BLD AUTO: 155 10E3/UL (ref 150–450)
POTASSIUM SERPL-SCNC: 4 MMOL/L (ref 3.4–5.3)
PROT SERPL-MCNC: 7.3 G/DL (ref 6.4–8.3)
RBC # BLD AUTO: 4.88 10E6/UL (ref 4.4–5.9)
SODIUM SERPL-SCNC: 140 MMOL/L (ref 135–145)
TRIGL SERPL-MCNC: 98 MG/DL
TSH SERPL DL<=0.005 MIU/L-ACNC: 2.24 UIU/ML (ref 0.3–4.2)
WBC # BLD AUTO: 4.1 10E3/UL (ref 4–11)

## 2025-02-24 PROCEDURE — 99000 SPECIMEN HANDLING OFFICE-LAB: CPT | Performed by: PATHOLOGY

## 2025-02-24 PROCEDURE — 84443 ASSAY THYROID STIM HORMONE: CPT | Performed by: PATHOLOGY

## 2025-02-24 PROCEDURE — 36415 COLL VENOUS BLD VENIPUNCTURE: CPT | Performed by: PATHOLOGY

## 2025-02-24 PROCEDURE — 80053 COMPREHEN METABOLIC PANEL: CPT | Performed by: PATHOLOGY

## 2025-02-24 PROCEDURE — 85025 COMPLETE CBC W/AUTO DIFF WBC: CPT | Performed by: PATHOLOGY

## 2025-02-24 PROCEDURE — 99396 PREV VISIT EST AGE 40-64: CPT | Performed by: INTERNAL MEDICINE

## 2025-02-24 PROCEDURE — 83036 HEMOGLOBIN GLYCOSYLATED A1C: CPT | Performed by: INTERNAL MEDICINE

## 2025-02-24 PROCEDURE — 80061 LIPID PANEL: CPT | Performed by: PATHOLOGY

## 2025-02-24 RX ORDER — TAMSULOSIN HYDROCHLORIDE 0.4 MG/1
0.8 CAPSULE ORAL DAILY
Qty: 180 CAPSULE | Refills: 3 | Status: SHIPPED | OUTPATIENT
Start: 2025-02-24

## 2025-02-24 RX ORDER — FINASTERIDE 5 MG/1
5 TABLET, FILM COATED ORAL DAILY
Qty: 90 TABLET | Refills: 3 | Status: SHIPPED | OUTPATIENT
Start: 2025-02-24

## 2025-02-24 RX ORDER — TADALAFIL 5 MG/1
5 TABLET ORAL EVERY 24 HOURS
Qty: 90 TABLET | Refills: 3 | Status: SHIPPED | OUTPATIENT
Start: 2025-02-24

## 2025-02-24 RX ORDER — ROSUVASTATIN CALCIUM 10 MG/1
10 TABLET, COATED ORAL DAILY
Qty: 90 TABLET | Refills: 3 | Status: SHIPPED | OUTPATIENT
Start: 2025-02-24

## 2025-02-24 RX ORDER — LEVOTHYROXINE SODIUM 125 UG/1
125 TABLET ORAL DAILY
Qty: 90 TABLET | Refills: 3 | Status: SHIPPED | OUTPATIENT
Start: 2025-02-24

## 2025-02-24 NOTE — PROGRESS NOTES
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  He continues to have issues regarding he has lower urinary tract symptoms.  He is on maximum dose of tamsulosin he is also taking the tadalafil on a daily basis.  Nevertheless he continues to have double voiding and slowing of the urinary stream.  He has attempted to adjust his medication but he is unable to reduce the dose of the tadalafil or the tamsulosin without aggravating the urinary symptoms.  He also feels that tadalafil can cause some daytime erections.  He has been researching finasteride and we discussed risk benefits and use of finasteride and he is interested in pursuing this.  We also discussed urology referral and potential for ho LEEP laser treatment or prostate embolization or alternative surgical procedures but he would like to see what his response to finasteride is first.  We discussed PSA screening and he deferred.  Otherwise he has been doing well.  He did not start the rosuvastatin.  We discussed he is ASCVD risk and the risk benefits of taking rosuvastatin especially in light of his borderline diabetes.  After discussion he agreed and I refilled this medication.  His wife is concerned that he snores and she has observed apnea.  His STOP-BANG score is 4 and we discussed referral to the sleep clinic for potential overnight screening oximetry.  Past Medical History:   Diagnosis Date    Diabetes (H)     preDiabetic    Graves' disease     s/p I-131    hemorrhoids     Hyperlipidemia     early metabolic syndrome    Hypothyroidism     IGT (impaired glucose tolerance)     Lower urinary tract symptoms (LUTS) 01/17/2018    Tubular adenoma 03/2018     Past Surgical History:   Procedure Laterality Date    COLONOSCOPY N/A 4/20/2021    Procedure: COLONOSCOPY, WITH POLYPECTOMY;  Surgeon: Kevin Esquivel DO;  Location: [facility] OR    elective circumcision       Family History   Problem Relation Age of Onset    Cancer  "Mother         throat cancer     Thyroid Disease Mother         Graves d    Diabetes Mother     Hypertension Father     Hyperlipidemia Father     Diabetes Brother     Thyroid Disease Sister         Graves d    Mental Illness Sister     Glaucoma No family hx of     Macular Degeneration No family hx of          Exam:  /77 (BP Location: Right arm, Patient Position: Sitting, Cuff Size: Adult Regular)   Pulse 86   Temp 98.2  F (36.8  C) (Oral)   Resp 16   Ht 1.778 m (5' 10\")   Wt 83.8 kg (184 lb 11.2 oz)   SpO2 98%   BMI 26.50 kg/m    184 lbs 11.2 oz  Physical Exam   The patient is alert, oriented with a clear sensorium.   Skin shows no lesions or rashes and good turgor.   Head is normocephalic and atraumatic.   Eyes show PERRLA with benign optic fundi.   Ears show normal TMs bilaterally.   Mouth shows clear oral mucosa.   Neck shows no nodes, thyromegaly or bruits.   Back is non tender.  Lungs are clear to percussion and auscultation.   Heart shows normal S1 and S2 without murmur or gallop.   Abdomen is soft and nontender without masses or organomegaly.   Genitalia show normal testes. No evidence of inguinal hernia.  Extremities show no edema and no evidence of active synovitis.   Neurologic examination shows cranial nerves II-XII intact. Motor shows 5/5 strength. Reflexes are symmetric. Cerebellar testing shows normal tandem gait.  Romberg negative.  The 10-year ASCVD risk score (Jannet MARTINEZ, et al., 2019) is: 12.9%    Values used to calculate the score:      Age: 59 years      Sex: Male      Is Non- : No      Diabetic: Yes      Tobacco smoker: No      Systolic Blood Pressure: 128 mmHg      Is BP treated: No      HDL Cholesterol: 54 mg/dL      Total Cholesterol: 183 mg/dL  STOP BANG=4  Labs reviewed:  Results for orders placed or performed in visit on 25   Comprehensive metabolic panel     Status: Abnormal   Result Value Ref Range    Sodium 140 135 - 145 mmol/L    Potassium " 4.0 3.4 - 5.3 mmol/L    Carbon Dioxide (CO2) 27 22 - 29 mmol/L    Anion Gap 10 7 - 15 mmol/L    Urea Nitrogen 14.1 8.0 - 23.0 mg/dL    Creatinine 0.83 0.67 - 1.17 mg/dL    GFR Estimate >90 >60 mL/min/1.73m2    Calcium 9.3 8.8 - 10.4 mg/dL    Chloride 103 98 - 107 mmol/L    Glucose 127 (H) 70 - 99 mg/dL    Alkaline Phosphatase 63 40 - 150 U/L    AST 21 0 - 45 U/L    ALT 23 0 - 70 U/L    Protein Total 7.3 6.4 - 8.3 g/dL    Albumin 4.7 3.5 - 5.2 g/dL    Bilirubin Total 0.9 <=1.2 mg/dL    Patient Fasting > 8hrs? Yes    Hemoglobin A1c     Status: Abnormal   Result Value Ref Range    Estimated Average Glucose 134 (H) <117 mg/dL    Hemoglobin A1C 6.3 (H) <5.7 %   Lipid Profile     Status: None   Result Value Ref Range    Cholesterol 166 <200 mg/dL    Triglycerides 98 <150 mg/dL    Direct Measure HDL 50 >=40 mg/dL    LDL Cholesterol Calculated 96 <100 mg/dL    Non HDL Cholesterol 116 <130 mg/dL    Patient Fasting > 8hrs? Yes     Narrative    Cholesterol  Desirable: < 200 mg/dL  Borderline High: 200 - 239 mg/dL  High: >= 240 mg/dL    Triglycerides  Normal: < 150 mg/dL  Borderline High: 150 - 199 mg/dL  High: 200-499 mg/dL  Very High: >= 500 mg/dL    Direct Measure HDL  Female: >= 50 mg/dL   Male: >= 40 mg/dL    LDL Cholesterol  Desirable: < 100 mg/dL  Above Desirable: 100 - 129 mg/dL   Borderline High: 130 - 159 mg/dL   High:  160 - 189 mg/dL   Very High: >= 190 mg/dL    Non HDL Cholesterol  Desirable: < 130 mg/dL  Above Desirable: 130 - 159 mg/dL  Borderline High: 160 - 189 mg/dL  High: 190 - 219 mg/dL  Very High: >= 220 mg/dL   TSH     Status: Normal   Result Value Ref Range    TSH 2.24 0.30 - 4.20 uIU/mL   CBC with platelets and differential     Status: None   Result Value Ref Range    WBC Count 4.1 4.0 - 11.0 10e3/uL    RBC Count 4.88 4.40 - 5.90 10e6/uL    Hemoglobin 15.0 13.3 - 17.7 g/dL    Hematocrit 45.4 40.0 - 53.0 %    MCV 93 78 - 100 fL    MCH 30.7 26.5 - 33.0 pg    MCHC 33.0 31.5 - 36.5 g/dL    RDW 12.3 10.0 -  15.0 %    Platelet Count 155 150 - 450 10e3/uL    % Neutrophils 71 %    % Lymphocytes 21 %    % Monocytes 5 %    % Eosinophils 2 %    % Basophils 1 %    % Immature Granulocytes 0 %    NRBCs per 100 WBC 0 <1 /100    Absolute Neutrophils 2.9 1.6 - 8.3 10e3/uL    Absolute Lymphocytes 0.9 0.8 - 5.3 10e3/uL    Absolute Monocytes 0.2 0.0 - 1.3 10e3/uL    Absolute Eosinophils 0.1 0.0 - 0.7 10e3/uL    Absolute Basophils 0.0 0.0 - 0.2 10e3/uL    Absolute Immature Granulocytes 0.0 <=0.4 10e3/uL    Absolute NRBCs 0.0 10e3/uL   CBC with Platelets & Differential     Status: None    Narrative    The following orders were created for panel order CBC with Platelets & Differential.  Procedure                               Abnormality         Status                     ---------                               -----------         ------                     CBC with platelets and d...[[id number]]                      Final result                 Please view results for these tests on the individual orders.       ASSESSMENT  1 hypothyroidism on 125 mcg daily  2 hyperlipidemia will start rosuvastatin  3 Diabetes controlled with lifestyle  4 tubular adenoma due for colonoscopy 2028  5 [facility] DJD both thumbs  6 Hypertension well controlled  7 history of mesenteric panniculitis symptomatically resolved  8 BPH with LUTS and urinary retention  9 possible sleep apnea    Plan  Will start on finasteride and he will give us a progress report on this in another 6 months.  Will refill his medications and again refill the rosuvastatin which she will take.  Will assess his labs today plan to him follow-up for reassessment in a year sooner if any increase symptoms or problems    This note was completed using Dragon voice recognition software.      Ritesh Paniagua MD  General Internal Medicine  [facility]  [phone]

## 2025-03-06 PROBLEM — R73.03 PREDIABETES: Status: ACTIVE | Noted: 2025-03-06

## 2025-03-06 PROBLEM — R73.03 PREDIABETES: Status: RESOLVED | Noted: 2025-03-06 | Resolved: 2025-03-06

## 2025-03-06 PROBLEM — R03.0 ELEVATED BP WITHOUT DIAGNOSIS OF HYPERTENSION: Status: ACTIVE | Noted: 2025-03-06

## 2025-05-31 ENCOUNTER — HEALTH MAINTENANCE LETTER (OUTPATIENT)
Age: 60
End: 2025-05-31

## (undated) DEVICE — SOL WATER IRRIG 500ML BOTTLE 2F7113

## (undated) DEVICE — GOWN IMPERVIOUS 2XL BLUE

## (undated) DEVICE — KIT ENDO TURNOVER/PROCEDURE CARRY-ON 101822

## (undated) DEVICE — SPECIMEN CONTAINER 3OZ W/FORMALIN 59901

## (undated) DEVICE — TUBING SUCTION 12"X1/4" N612

## (undated) DEVICE — SUCTION MANIFOLD NEPTUNE 2 SYS 1 PORT 702-025-000

## (undated) DEVICE — ENDO FORCEP BX CAPTURA PRO SPIKE G50696

## (undated) RX ORDER — FENTANYL CITRATE 50 UG/ML
INJECTION, SOLUTION INTRAMUSCULAR; INTRAVENOUS
Status: DISPENSED
Start: 2021-04-20

## (undated) RX ORDER — ONDANSETRON 4 MG/1
TABLET, ORALLY DISINTEGRATING ORAL
Status: DISPENSED
Start: 2021-04-20